# Patient Record
Sex: FEMALE | URBAN - METROPOLITAN AREA
[De-identification: names, ages, dates, MRNs, and addresses within clinical notes are randomized per-mention and may not be internally consistent; named-entity substitution may affect disease eponyms.]

---

## 2022-12-18 ENCOUNTER — INPATIENT (INPATIENT)
Facility: HOSPITAL | Age: 82
LOS: 4 days | Discharge: NURSING, REHAB OR LONG-TERM CARE | DRG: 638 | End: 2022-12-23
Attending: HOSPITALIST | Admitting: HOSPITALIST
Payer: MEDICARE

## 2022-12-18 VITALS
WEIGHT: 130.07 LBS | OXYGEN SATURATION: 97 % | SYSTOLIC BLOOD PRESSURE: 120 MMHG | DIASTOLIC BLOOD PRESSURE: 60 MMHG | HEIGHT: 58 IN | RESPIRATION RATE: 15 BRPM | TEMPERATURE: 98 F | HEART RATE: 98 BPM

## 2022-12-18 DIAGNOSIS — R41.82 ALTERED MENTAL STATUS, UNSPECIFIED: ICD-10-CM

## 2022-12-18 LAB
ACETONE SERPL-MCNC: ABNORMAL
ALBUMIN SERPL ELPH-MCNC: 3.1 G/DL — LOW (ref 3.3–5)
ALP SERPL-CCNC: 56 U/L — SIGNIFICANT CHANGE UP (ref 30–120)
ALT FLD-CCNC: 21 U/L DA — SIGNIFICANT CHANGE UP (ref 10–60)
ANION GAP SERPL CALC-SCNC: 10 MMOL/L — SIGNIFICANT CHANGE UP (ref 5–17)
APPEARANCE UR: ABNORMAL
AST SERPL-CCNC: 26 U/L — SIGNIFICANT CHANGE UP (ref 10–40)
BASE EXCESS BLDV CALC-SCNC: 1.7 MMOL/L — SIGNIFICANT CHANGE UP (ref -2–3)
BASOPHILS # BLD AUTO: 0.04 K/UL — SIGNIFICANT CHANGE UP (ref 0–0.2)
BASOPHILS NFR BLD AUTO: 0.3 % — SIGNIFICANT CHANGE UP (ref 0–2)
BILIRUB SERPL-MCNC: 0 MG/DL — LOW (ref 0.2–1.2)
BILIRUB UR-MCNC: NEGATIVE — SIGNIFICANT CHANGE UP
BUN SERPL-MCNC: 37 MG/DL — HIGH (ref 7–23)
CALCIUM SERPL-MCNC: 8.9 MG/DL — SIGNIFICANT CHANGE UP (ref 8.4–10.5)
CHLORIDE SERPL-SCNC: 98 MMOL/L — SIGNIFICANT CHANGE UP (ref 96–108)
CK MB BLD-MCNC: 1.2 % — SIGNIFICANT CHANGE UP (ref 0–3.5)
CK MB CFR SERPL CALC: 2.3 NG/ML — SIGNIFICANT CHANGE UP (ref 0–3.6)
CK SERPL-CCNC: 198 U/L — HIGH (ref 26–192)
CO2 SERPL-SCNC: 26 MMOL/L — SIGNIFICANT CHANGE UP (ref 22–31)
COLOR SPEC: YELLOW — SIGNIFICANT CHANGE UP
CREAT SERPL-MCNC: 1 MG/DL — SIGNIFICANT CHANGE UP (ref 0.5–1.3)
DIFF PNL FLD: ABNORMAL
EGFR: 56 ML/MIN/1.73M2 — LOW
EOSINOPHIL # BLD AUTO: 0 K/UL — SIGNIFICANT CHANGE UP (ref 0–0.5)
EOSINOPHIL NFR BLD AUTO: 0 % — SIGNIFICANT CHANGE UP (ref 0–6)
FLUAV AG NPH QL: SIGNIFICANT CHANGE UP
FLUBV AG NPH QL: SIGNIFICANT CHANGE UP
GLUCOSE BLDC GLUCOMTR-MCNC: 367 MG/DL — HIGH (ref 70–99)
GLUCOSE SERPL-MCNC: 430 MG/DL — HIGH (ref 70–99)
GLUCOSE UR QL: 1000 MG/DL
HCO3 BLDV-SCNC: 27 MMOL/L — SIGNIFICANT CHANGE UP (ref 22–29)
HCT VFR BLD CALC: 27.6 % — LOW (ref 34.5–45)
HGB BLD-MCNC: 8.9 G/DL — LOW (ref 11.5–15.5)
IMM GRANULOCYTES NFR BLD AUTO: 0.3 % — SIGNIFICANT CHANGE UP (ref 0–0.9)
KETONES UR-MCNC: ABNORMAL
LEUKOCYTE ESTERASE UR-ACNC: ABNORMAL
LIDOCAIN IGE QN: 400 U/L — HIGH (ref 73–393)
LYMPHOCYTES # BLD AUTO: 14.9 % — SIGNIFICANT CHANGE UP (ref 13–44)
LYMPHOCYTES # BLD AUTO: 2.29 K/UL — SIGNIFICANT CHANGE UP (ref 1–3.3)
MAGNESIUM SERPL-MCNC: 2.6 MG/DL — SIGNIFICANT CHANGE UP (ref 1.6–2.6)
MCHC RBC-ENTMCNC: 28.8 PG — SIGNIFICANT CHANGE UP (ref 27–34)
MCHC RBC-ENTMCNC: 32.2 GM/DL — SIGNIFICANT CHANGE UP (ref 32–36)
MCV RBC AUTO: 89.3 FL — SIGNIFICANT CHANGE UP (ref 80–100)
MONOCYTES # BLD AUTO: 0.96 K/UL — HIGH (ref 0–0.9)
MONOCYTES NFR BLD AUTO: 6.3 % — SIGNIFICANT CHANGE UP (ref 2–14)
NEUTROPHILS # BLD AUTO: 12.02 K/UL — HIGH (ref 1.8–7.4)
NEUTROPHILS NFR BLD AUTO: 78.2 % — HIGH (ref 43–77)
NITRITE UR-MCNC: NEGATIVE — SIGNIFICANT CHANGE UP
NRBC # BLD: 0 /100 WBCS — SIGNIFICANT CHANGE UP (ref 0–0)
PCO2 BLDV: 48 MMHG — HIGH (ref 39–42)
PH BLDV: 7.36 — SIGNIFICANT CHANGE UP (ref 7.32–7.43)
PH UR: 6.5 — SIGNIFICANT CHANGE UP (ref 5–8)
PLATELET # BLD AUTO: 357 K/UL — SIGNIFICANT CHANGE UP (ref 150–400)
PO2 BLDV: 35 MMHG — SIGNIFICANT CHANGE UP (ref 25–45)
POTASSIUM SERPL-MCNC: 4.8 MMOL/L — SIGNIFICANT CHANGE UP (ref 3.5–5.3)
POTASSIUM SERPL-SCNC: 4.8 MMOL/L — SIGNIFICANT CHANGE UP (ref 3.5–5.3)
PROT SERPL-MCNC: 7.1 G/DL — SIGNIFICANT CHANGE UP (ref 6–8.3)
PROT UR-MCNC: 15 MG/DL
RBC # BLD: 3.09 M/UL — LOW (ref 3.8–5.2)
RBC # FLD: 14 % — SIGNIFICANT CHANGE UP (ref 10.3–14.5)
RSV RNA NPH QL NAA+NON-PROBE: SIGNIFICANT CHANGE UP
SAO2 % BLDV: 57.9 % — LOW (ref 67–88)
SARS-COV-2 RNA SPEC QL NAA+PROBE: SIGNIFICANT CHANGE UP
SODIUM SERPL-SCNC: 134 MMOL/L — LOW (ref 135–145)
SP GR SPEC: 1.01 — SIGNIFICANT CHANGE UP (ref 1.01–1.02)
TROPONIN I, HIGH SENSITIVITY RESULT: 12.1 NG/L — SIGNIFICANT CHANGE UP
UROBILINOGEN FLD QL: NEGATIVE MG/DL — SIGNIFICANT CHANGE UP
WBC # BLD: 15.35 K/UL — HIGH (ref 3.8–10.5)
WBC # FLD AUTO: 15.35 K/UL — HIGH (ref 3.8–10.5)

## 2022-12-18 PROCEDURE — 99285 EMERGENCY DEPT VISIT HI MDM: CPT

## 2022-12-18 PROCEDURE — 73000 X-RAY EXAM OF COLLAR BONE: CPT | Mod: 26,LT

## 2022-12-18 PROCEDURE — 73030 X-RAY EXAM OF SHOULDER: CPT | Mod: 26,LT

## 2022-12-18 PROCEDURE — 99223 1ST HOSP IP/OBS HIGH 75: CPT | Mod: AI

## 2022-12-18 PROCEDURE — 72125 CT NECK SPINE W/O DYE: CPT | Mod: 26,MA

## 2022-12-18 PROCEDURE — 71045 X-RAY EXAM CHEST 1 VIEW: CPT | Mod: 26

## 2022-12-18 PROCEDURE — 73630 X-RAY EXAM OF FOOT: CPT | Mod: 26,LT

## 2022-12-18 PROCEDURE — 70450 CT HEAD/BRAIN W/O DYE: CPT | Mod: 26,MA

## 2022-12-18 PROCEDURE — 93010 ELECTROCARDIOGRAM REPORT: CPT

## 2022-12-18 RX ORDER — LANOLIN ALCOHOL/MO/W.PET/CERES
3 CREAM (GRAM) TOPICAL AT BEDTIME
Refills: 0 | Status: DISCONTINUED | OUTPATIENT
Start: 2022-12-18 | End: 2022-12-23

## 2022-12-18 RX ORDER — GLUCAGON INJECTION, SOLUTION 0.5 MG/.1ML
1 INJECTION, SOLUTION SUBCUTANEOUS ONCE
Refills: 0 | Status: DISCONTINUED | OUTPATIENT
Start: 2022-12-18 | End: 2022-12-23

## 2022-12-18 RX ORDER — SODIUM CHLORIDE 9 MG/ML
1000 INJECTION INTRAMUSCULAR; INTRAVENOUS; SUBCUTANEOUS ONCE
Refills: 0 | Status: COMPLETED | OUTPATIENT
Start: 2022-12-18 | End: 2022-12-18

## 2022-12-18 RX ORDER — INSULIN GLARGINE 100 [IU]/ML
20 INJECTION, SOLUTION SUBCUTANEOUS AT BEDTIME
Refills: 0 | Status: DISCONTINUED | OUTPATIENT
Start: 2022-12-18 | End: 2022-12-20

## 2022-12-18 RX ORDER — DEXTROSE 50 % IN WATER 50 %
15 SYRINGE (ML) INTRAVENOUS ONCE
Refills: 0 | Status: DISCONTINUED | OUTPATIENT
Start: 2022-12-18 | End: 2022-12-23

## 2022-12-18 RX ORDER — ONDANSETRON 8 MG/1
4 TABLET, FILM COATED ORAL EVERY 8 HOURS
Refills: 0 | Status: DISCONTINUED | OUTPATIENT
Start: 2022-12-18 | End: 2022-12-23

## 2022-12-18 RX ORDER — INSULIN LISPRO 100/ML
VIAL (ML) SUBCUTANEOUS
Refills: 0 | Status: DISCONTINUED | OUTPATIENT
Start: 2022-12-18 | End: 2022-12-20

## 2022-12-18 RX ORDER — DEXTROSE 50 % IN WATER 50 %
25 SYRINGE (ML) INTRAVENOUS ONCE
Refills: 0 | Status: DISCONTINUED | OUTPATIENT
Start: 2022-12-18 | End: 2022-12-23

## 2022-12-18 RX ORDER — SODIUM CHLORIDE 9 MG/ML
1000 INJECTION, SOLUTION INTRAVENOUS
Refills: 0 | Status: DISCONTINUED | OUTPATIENT
Start: 2022-12-18 | End: 2022-12-23

## 2022-12-18 RX ORDER — DILTIAZEM HCL 120 MG
120 CAPSULE, EXT RELEASE 24 HR ORAL DAILY
Refills: 0 | Status: DISCONTINUED | OUTPATIENT
Start: 2022-12-18 | End: 2022-12-23

## 2022-12-18 RX ORDER — ACETAMINOPHEN 500 MG
650 TABLET ORAL EVERY 6 HOURS
Refills: 0 | Status: DISCONTINUED | OUTPATIENT
Start: 2022-12-18 | End: 2022-12-23

## 2022-12-18 RX ORDER — SIMVASTATIN 20 MG/1
5 TABLET, FILM COATED ORAL AT BEDTIME
Refills: 0 | Status: DISCONTINUED | OUTPATIENT
Start: 2022-12-18 | End: 2022-12-23

## 2022-12-18 RX ADMIN — SODIUM CHLORIDE 1000 MILLILITER(S): 9 INJECTION INTRAMUSCULAR; INTRAVENOUS; SUBCUTANEOUS at 13:05

## 2022-12-18 RX ADMIN — INSULIN GLARGINE 20 UNIT(S): 100 INJECTION, SOLUTION SUBCUTANEOUS at 21:59

## 2022-12-18 RX ADMIN — SIMVASTATIN 5 MILLIGRAM(S): 20 TABLET, FILM COATED ORAL at 21:58

## 2022-12-18 RX ADMIN — SODIUM CHLORIDE 1000 MILLILITER(S): 9 INJECTION INTRAMUSCULAR; INTRAVENOUS; SUBCUTANEOUS at 14:00

## 2022-12-18 NOTE — ED ADULT NURSE NOTE - OBJECTIVE STATEMENT
83yo female c/o left shoulder pain secondary to a fall that occurred 1 week prior to ED arrival. "it hurts to move the arm" as per pt.

## 2022-12-18 NOTE — H&P ADULT - HISTORY OF PRESENT ILLNESS
81 yo f pmh HTN, HLD, DM2 brought in by son noted to have a FSBS of 400 at home.   Noted to having a fall last week.     In the ED    WBC 15.35, Na 134, Glucose 430  CT head and cervical neck negative  Lipase 400  U/A shows trace looks, with moderate blood    IV 1000 NS bolus X 1 given   83 yo f pmh HTN, HLD, DM2 brought in by son noted to have a FSBS of 400 at home.   Noted to having a fall this past friday on the 16th.  Son noticed her blood sugar was very high and patient couldn't herself upright and was falling.  Pain was also getting worse so he sofy her in.   History taken from the son over the phone.      In the ED    WBC 15.35, Na 134, Glucose 430  CT head and cervical neck negative  Lipase 400  U/A shows trace looks, with moderate blood    IV 1000 NS bolus X 1 given    Shoulder xrays show that there is a fracture.     Sent a consult out to Dr. Rodriguez orthopedic for eval

## 2022-12-18 NOTE — ED PROVIDER NOTE - CARE PLAN
1 Principal Discharge DX:	AMS (altered mental status)  Secondary Diagnosis:	Hyperglycemia  Secondary Diagnosis:	Shoulder fracture, left

## 2022-12-18 NOTE — H&P ADULT - ASSESSMENT
83 yo f pmh HTN, HLD, DM2 admitted for  83 yo f pmh HTN, HLD, DM2 admitted for Hyperglycemia and Left shoulder fracture    #Left shoulder fracture  GMF  consulted Ortho Dr. Rodriguez for consult  PT eval    #DM2 with hyperglycemia  hold metformin  continue lantus at night at 20 units qhs  MDISS  A1C  consult endocrine    #HTN  continue cardizem daily with hold parameters    #HLD  continue statins

## 2022-12-18 NOTE — H&P ADULT - NSHPPHYSICALEXAM_GEN_ALL_CORE
Vital Signs Last 24 Hrs  T(C): 37 (18 Dec 2022 10:56), Max: 37 (18 Dec 2022 10:56)  T(F): 98.6 (18 Dec 2022 10:56), Max: 98.6 (18 Dec 2022 10:56)  HR: 80 (18 Dec 2022 12:30) (80 - 98)  BP: 115/75 (18 Dec 2022 12:30) (112/65 - 120/60)  BP(mean): --  RR: 15 (18 Dec 2022 12:30) (15 - 16)  SpO2: 96% (18 Dec 2022 12:30) (96% - 97%)    Parameters below as of 18 Dec 2022 12:30  Patient On (Oxygen Delivery Method): room air      GENERAL: NAD, well-groomed, well-developed  HEAD:  Atraumatic, Normocephalic  EYES: EOMI, PERRLA, conjunctiva and sclera clear  ENMT: No tonsillar erythema, exudates, or enlargement; Moist mucous membranes  NECK: Supple, No JVD, Normal thyroid  CHEST/LUNG: Clear to percussion bilaterally; No rales, rhonchi, wheezing, or rubs  HEART: Regular rate and rhythm; No murmurs, rubs, or gallops  ABDOMEN: Soft, Nontender, Nondistended; Bowel sounds present  EXTREMITIES:  2+ Peripheral Pulses, No clubbing, cyanosis, or edema  NERVOUS SYSTEM:  Alert & Oriented X3, Good concentration; Motor Strength 5/5 B/L upper and lower extremities; DTRs 2+ intact and symmetric  SKIN: No rashes or lesions Vital Signs Last 24 Hrs  T(C): 37 (18 Dec 2022 10:56), Max: 37 (18 Dec 2022 10:56)  T(F): 98.6 (18 Dec 2022 10:56), Max: 98.6 (18 Dec 2022 10:56)  HR: 80 (18 Dec 2022 12:30) (80 - 98)  BP: 115/75 (18 Dec 2022 12:30) (112/65 - 120/60)  BP(mean): --  RR: 15 (18 Dec 2022 12:30) (15 - 16)  SpO2: 96% (18 Dec 2022 12:30) (96% - 97%)    Parameters below as of 18 Dec 2022 12:30  Patient On (Oxygen Delivery Method): room air      GENERAL: NAD, well-groomed, well-developed  HEAD:  Atraumatic, Normocephalic  EYES: EOMI, PERRLA, conjunctiva and sclera clear  ENMT: No tonsillar erythema, exudates, or enlargement; Moist mucous membranes  NECK: Supple, No JVD, Normal thyroid  CHEST/LUNG: Clear to percussion bilaterally; No rales, rhonchi, wheezing, or rubs  HEART: Regular rate and rhythm; No murmurs, rubs, or gallops  ABDOMEN: Soft, Nontender, Nondistended; Bowel sounds present  EXTREMITIES: +Left shoulder in sling,  2+ Peripheral Pulses, No clubbing, cyanosis, or edema  NERVOUS SYSTEM:  Alert & Oriented X3, Good concentration;  SKIN: No rashes or lesions

## 2022-12-18 NOTE — ED PROVIDER NOTE - PHYSICAL EXAMINATION
Patient currently awake and oriented x3  Diffuse tenderness to palpation to the left shoulder with limited range of motion swelling and ecchymosis.  Sensation is grossly intact to the left upper extremity no tenderness palpation to the left elbow forearm wrist or hand.   strength 5 out of 5 positive radial pulse cap refill less than 2 seconds  No tenderness to palpation to the right upper extremity full range of motion neurovascular intact  No pelvic tenderness to palpation  No tenderness to palpation to the right lower extremity neurovascular intact  Tenderness to palpation to the left lateral foot with no obvious deformity positive pedal pulse cap refill less than 2 seconds sensation grossly intact  No midline C–T just L tenderness to palpation.

## 2022-12-18 NOTE — ED ADULT NURSE NOTE - NSIMPLEMENTINTERV_GEN_ALL_ED
Implemented All Fall with Harm Risk Interventions:  Vivian to call system. Call bell, personal items and telephone within reach. Instruct patient to call for assistance. Room bathroom lighting operational. Non-slip footwear when patient is off stretcher. Physically safe environment: no spills, clutter or unnecessary equipment. Stretcher in lowest position, wheels locked, appropriate side rails in place. Provide visual cue, wrist band, yellow gown, etc. Monitor gait and stability. Monitor for mental status changes and reorient to person, place, and time. Review medications for side effects contributing to fall risk. Reinforce activity limits and safety measures with patient and family. Provide visual clues: red socks.

## 2022-12-18 NOTE — ED ADULT TRIAGE NOTE - NS ED TRIAGE AVPU SCALE
[FreeTextEntry1] : Use humidifier, saline nasal drops, encourage fluids and fever control as needed. Elevate head of bed. Return for spiking fever, worsening symptoms, respiratory distress or concerns.\par NO EVIDENCE FOR SINUSITIS\par \par ASTHMA - IN CONTROL\par CONTINUE ICS AND ALBUTEROL\par RTO PRN advised on signs and symptoms requiring re evaluation and concern.\par \par RTO FOR FLU VACCINE
Alert-The patient is alert, awake and responds to voice. The patient is oriented to time, place, and person. The triage nurse is able to obtain subjective information.

## 2022-12-18 NOTE — ED PROVIDER NOTE - CLINICAL SUMMARY MEDICAL DECISION MAKING FREE TEXT BOX
Patient presenting to the emergency room with a chief complaints of episode of altered mental status today and recent fall 16th with shoulder pain and foot pain.  For the fall on the 16th it appears the patient suffered a mechanical fall with no LOC.  Based on physical exam high suspicion for left shoulder fracture although neurovascularly intact.  Low suspicion for foot fracture.  Will obtain x-ray imaging of both the shoulder and foot.  For episode of altered mental status today patient was ambulatory and standing at the time and appeared to have a questionable syncopal episode with a brief period of altered mental status.  This may have been secondary to a vasovagal episode as patient has been in significant pain secondary to her suspected left shoulder fracture.  Her brief episode of altered mental status might of been from hypoperfusion following the event.  Currently awake and oriented x3.  Low suspicion for a CVA given the fact that this is a brief episode of generalized LOC and her altered mental status was likely due to hypoperfusion although she is also hyperglycemic so this could have been a contributing factor as well.  Will obtain screening labs serum acetone venous pH check UA urine culture to exclude a possible infectious course for hyperglycemia although this could also be pain induced.  Will obtain CT imaging of her head to exclude an underlying neurologic process will hydrate and monitor.  Labs noted patient with a mildly elevated white counts cold anion gap mild serum acetone on but normal venous pH.  Urine does not appear to be grossly infected.  CT head imaging reviewed no CT evidence of an acute intracranial hemorrhage.  There is not appear to be a fracture on patient's left foot although there is an acute comminuted left humeral head and neck fracture.  Given patient's age and advanced debility and elevated sugars with this episode of syncope is recommended that patient be admitted to the hospital for observation and further management.  Patient sugars are coming down with hydration.  Neurology was consulted and will see the patient.  Orthopedics was consulted by the hospitalist team.  At this time there does not appear that patient is in DKA and so no insulin drip will be required.  Will admit for further management and pain control.  Of note patient's left arm was placed in a sling and she remains neurovascularly intact.

## 2022-12-18 NOTE — ED ADULT TRIAGE NOTE - CHIEF COMPLAINT QUOTE
patient is from home, AMS as per pt's son, s/p fall last week and injured her left shoulder, noted swelling and bruise,      also FS over 400 at home

## 2022-12-18 NOTE — ED ADULT NURSE REASSESSMENT NOTE - NS ED NURSE REASSESS COMMENT FT1
Assumed care of pt from CLARIBEL Calero. aox3. Pt resting comfortably in stretcher. Pt reports pain to left shoulder and left leg. Denies chest pain, SOB, headaches, dizziness, n/v/d, abd pain and dysuria. Pt on cardiac monitor in NSR 84 HR. Respirations even and unlabored. Skin warm to touch.

## 2022-12-18 NOTE — ED PROVIDER NOTE - OBJECTIVE STATEMENT
Patient is an 82-year-old female who presents to the emergency room with a chief complaint of fall and questionable altered mental status.  Past medical history of hypertension, hyperlipidemia, diabetes.  Patient and son report that this past Friday patient suffered a mechanical fall injuring her left foot and left shoulder.  Patient son reports she has been complaining of pain to both the left foot and shoulder since he initially did a visual inspection did not see any acute injury and so the decision was made not to take the patient to the hospital.  She had no LOC during this initial fall and is not on any anticoagulants.  Patient has continued to complain of severe pain to the left shoulder and intermittent pain to the left foot.  This morning she awoke in her normal state of health and earlier in the morning he took the patient to the restroom.  At around 10 AM patient again had to go to the restroom.  She was being assisted by her aide when her legs suddenly gave out and she collapsed to the ground.  Her eyes were open and she had no generalized tonic-clonic shaking but patient did not respond immediately to them and when she began to talk again she appeared to be confused initially but this is since resolved.  EMS was called family checked her blood sugar was noted it was elevated at 487 and so the decision was made to take the patient to the hospital for further management.  Patient denies any fevers chills nausea vomiting chest pain shortness of breath or abdominal pain.  She does report pain to the left shoulder and left lateral foot.  Denies any extremity numbness.  Denies neck or back pain.  Pt is right hand dominant

## 2022-12-19 LAB
A1C WITH ESTIMATED AVERAGE GLUCOSE RESULT: 8.6 % — HIGH (ref 4–5.6)
ALBUMIN SERPL ELPH-MCNC: 2.8 G/DL — LOW (ref 3.3–5)
ALP SERPL-CCNC: 53 U/L — SIGNIFICANT CHANGE UP (ref 30–120)
ALT FLD-CCNC: 20 U/L DA — SIGNIFICANT CHANGE UP (ref 10–60)
ANION GAP SERPL CALC-SCNC: 7 MMOL/L — SIGNIFICANT CHANGE UP (ref 5–17)
AST SERPL-CCNC: 17 U/L — SIGNIFICANT CHANGE UP (ref 10–40)
BILIRUB SERPL-MCNC: 0.3 MG/DL — SIGNIFICANT CHANGE UP (ref 0.2–1.2)
BUN SERPL-MCNC: 37 MG/DL — HIGH (ref 7–23)
CALCIUM SERPL-MCNC: 8.4 MG/DL — SIGNIFICANT CHANGE UP (ref 8.4–10.5)
CHLORIDE SERPL-SCNC: 102 MMOL/L — SIGNIFICANT CHANGE UP (ref 96–108)
CO2 SERPL-SCNC: 29 MMOL/L — SIGNIFICANT CHANGE UP (ref 22–31)
CREAT SERPL-MCNC: 0.89 MG/DL — SIGNIFICANT CHANGE UP (ref 0.5–1.3)
EGFR: 64 ML/MIN/1.73M2 — SIGNIFICANT CHANGE UP
ESTIMATED AVERAGE GLUCOSE: 200 MG/DL — HIGH (ref 68–114)
GLUCOSE BLDC GLUCOMTR-MCNC: 171 MG/DL — HIGH (ref 70–99)
GLUCOSE BLDC GLUCOMTR-MCNC: 222 MG/DL — HIGH (ref 70–99)
GLUCOSE SERPL-MCNC: 139 MG/DL — HIGH (ref 70–99)
HCT VFR BLD CALC: 25.4 % — LOW (ref 34.5–45)
HGB BLD-MCNC: 8.1 G/DL — LOW (ref 11.5–15.5)
MCHC RBC-ENTMCNC: 28.3 PG — SIGNIFICANT CHANGE UP (ref 27–34)
MCHC RBC-ENTMCNC: 31.9 GM/DL — LOW (ref 32–36)
MCV RBC AUTO: 88.8 FL — SIGNIFICANT CHANGE UP (ref 80–100)
NRBC # BLD: 0 /100 WBCS — SIGNIFICANT CHANGE UP (ref 0–0)
PLATELET # BLD AUTO: 338 K/UL — SIGNIFICANT CHANGE UP (ref 150–400)
POTASSIUM SERPL-MCNC: 4.4 MMOL/L — SIGNIFICANT CHANGE UP (ref 3.5–5.3)
POTASSIUM SERPL-SCNC: 4.4 MMOL/L — SIGNIFICANT CHANGE UP (ref 3.5–5.3)
PROT SERPL-MCNC: 6.4 G/DL — SIGNIFICANT CHANGE UP (ref 6–8.3)
RBC # BLD: 2.86 M/UL — LOW (ref 3.8–5.2)
RBC # FLD: 13.8 % — SIGNIFICANT CHANGE UP (ref 10.3–14.5)
SODIUM SERPL-SCNC: 138 MMOL/L — SIGNIFICANT CHANGE UP (ref 135–145)
WBC # BLD: 10.4 K/UL — SIGNIFICANT CHANGE UP (ref 3.8–10.5)
WBC # FLD AUTO: 10.4 K/UL — SIGNIFICANT CHANGE UP (ref 3.8–10.5)

## 2022-12-19 PROCEDURE — 99221 1ST HOSP IP/OBS SF/LOW 40: CPT | Mod: FS

## 2022-12-19 PROCEDURE — 99233 SBSQ HOSP IP/OBS HIGH 50: CPT

## 2022-12-19 PROCEDURE — 99231 SBSQ HOSP IP/OBS SF/LOW 25: CPT | Mod: FS

## 2022-12-19 RX ORDER — OXYCODONE AND ACETAMINOPHEN 5; 325 MG/1; MG/1
1 TABLET ORAL EVERY 4 HOURS
Refills: 0 | Status: DISCONTINUED | OUTPATIENT
Start: 2022-12-19 | End: 2022-12-23

## 2022-12-19 RX ORDER — MORPHINE SULFATE 50 MG/1
2 CAPSULE, EXTENDED RELEASE ORAL EVERY 4 HOURS
Refills: 0 | Status: DISCONTINUED | OUTPATIENT
Start: 2022-12-19 | End: 2022-12-23

## 2022-12-19 RX ADMIN — MORPHINE SULFATE 2 MILLIGRAM(S): 50 CAPSULE, EXTENDED RELEASE ORAL at 10:15

## 2022-12-19 RX ADMIN — Medication 10: at 00:09

## 2022-12-19 RX ADMIN — Medication 2: at 12:08

## 2022-12-19 RX ADMIN — MORPHINE SULFATE 2 MILLIGRAM(S): 50 CAPSULE, EXTENDED RELEASE ORAL at 10:16

## 2022-12-19 RX ADMIN — Medication 4: at 15:38

## 2022-12-19 RX ADMIN — Medication 120 MILLIGRAM(S): at 06:24

## 2022-12-19 RX ADMIN — INSULIN GLARGINE 20 UNIT(S): 100 INJECTION, SOLUTION SUBCUTANEOUS at 21:38

## 2022-12-19 RX ADMIN — SIMVASTATIN 5 MILLIGRAM(S): 20 TABLET, FILM COATED ORAL at 21:21

## 2022-12-19 RX ADMIN — Medication 4: at 09:05

## 2022-12-19 NOTE — PHYSICAL THERAPY INITIAL EVALUATION ADULT - RANGE OF MOTION EXAMINATION, REHAB EVAL
LUE n/t except finger/wrist flex/ext which was WNL's/Right UE ROM was WNL (within normal limits)/bilateral lower extremity was ROM was WNL (within normal limits)

## 2022-12-19 NOTE — PHYSICAL THERAPY INITIAL EVALUATION ADULT - RISK REDUCTION/PREVENTION, PT EVAL
Immediate Post-Op Evalulation


Immediate Post-Op Evalulation


Procedure:  Colonoscopy


Date of Evaluation:  Mar 3, 2021


Time of Evaluation:  09:49


IV Fluids:  400 LR


Blood Products:  0


Estimated Blood Loss:  1


Urinary Output:  0


Blood Pressure Systolic:  136


Blood Pressure Diastolic:  85


Pulse Rate:  84


Respiratory Rate:  16


O2 Sat by Pulse Oximetry:  98


Temperature (Fahrenheit):  98


Pain Score (1-10):  1


Nausea:  No


Vomiting:  No


Complications


0


Patient Status:  awake, reacts, patent, none


Hydration Status:  adequate











Itz Arzola MD                 Mar 3, 2021 09:24 risk factors

## 2022-12-19 NOTE — CONSULT NOTE ADULT - SUBJECTIVE AND OBJECTIVE BOX
This is an 82 y.o. RHD female who fell at home onto her left shoulder. She is being admitted for uncontrolled hyperglycemia. Patient c/o severe left shoulder pain, inability to tolerate ROM. X-rays in ED reveal left proximal humerus fracture.    On exam: Left shoulder is in sling. + diffuse ecchymosis about shoulder girdle and arm to elbow. +swelling. + diffusely tender to palpation about humeral head.  This is an 82 y.o. RHD female who fell at home onto her left shoulder. She is being admitted for uncontrolled hyperglycemia. Patient c/o severe left shoulder pain, inability to tolerate ROM. X-rays in ED reveal left proximal humerus fracture.    On exam: Left shoulder is in sling. + diffuse ecchymosis about shoulder girdle and arm to elbow. +swelling. + diffusely tender to palpation about humeral head. Unable to tolerate elbow ROM secondary to shoulder pain. Good mobility with wrist ROM, reasonable  strength. SILT. 2+ radial pulse.    X-rays Left shoulder: comminuted, impacted right humeral head fracture, 2 part surgical neck fracture.  X-rays were reviewed with patient on computer on wheels.    A/P: as above. Sling, Ice, PT/OT for gentle elbow, wrist, and hand ROM as tolerated. Discussed non-operative care with patient who agrees with plan. Analgesia as needed. Discussed with Dr. Rafi Flores and Dr. Lara. F/u as outpatient with Dr. Flores in 1-2 weeks for repeat x-rays and re-evaluation.

## 2022-12-19 NOTE — ED ADULT NURSE REASSESSMENT NOTE - NS ED NURSE REASSESS COMMENT FT1
Pt resting comfortably in bed. Pt denies any complaints at the moment. Pt denies headaches, dizziness, chest pain, SOB, abd pain, n/v/d, dysuria. Pt on cardiac monitor in NSR 93 HR. Respirations even and unlabored. Skin warm to touch.

## 2022-12-19 NOTE — PROGRESS NOTE ADULT - SUBJECTIVE AND OBJECTIVE BOX
Patient is a 82y old  Female who presents with a chief complaint of     INTERVAL HPI/OVERNIGHT EVENTS:    no overnight events    MEDICATIONS  (STANDING):  dextrose 5%. 1000 milliLiter(s) (50 mL/Hr) IV Continuous <Continuous>  dextrose 50% Injectable 25 Gram(s) IV Push once  diltiazem    milliGRAM(s) Oral daily  glucagon  Injectable 1 milliGRAM(s) IntraMuscular once  insulin glargine Injectable (LANTUS) 20 Unit(s) SubCutaneous at bedtime  insulin lispro (ADMELOG) corrective regimen sliding scale   SubCutaneous three times a day before meals  simvastatin 5 milliGRAM(s) Oral at bedtime    MEDICATIONS  (PRN):  acetaminophen     Tablet .. 650 milliGRAM(s) Oral every 6 hours PRN Temp greater or equal to 38C (100.4F), Mild Pain (1 - 3)  aluminum hydroxide/magnesium hydroxide/simethicone Suspension 30 milliLiter(s) Oral every 4 hours PRN Dyspepsia  dextrose Oral Gel 15 Gram(s) Oral once PRN Blood Glucose LESS THAN 70 milliGRAM(s)/deciliter  melatonin 3 milliGRAM(s) Oral at bedtime PRN Insomnia  morphine  - Injectable 2 milliGRAM(s) IV Push every 4 hours PRN Moderate Pain (4 - 6)  ondansetron Injectable 4 milliGRAM(s) IV Push every 8 hours PRN Nausea and/or Vomiting  oxycodone    5 mG/acetaminophen 325 mG 1 Tablet(s) Oral every 4 hours PRN Mild Pain (1 - 3)      Allergies    No Known Allergies    Intolerances        REVIEW OF SYSTEMS:  as above    Vital Signs Last 24 Hrs  T(C): 36.7 (19 Dec 2022 07:49), Max: 37.1 (19 Dec 2022 00:48)  T(F): 98.1 (19 Dec 2022 07:49), Max: 98.7 (19 Dec 2022 00:48)  HR: 96 (19 Dec 2022 07:49) (85 - 100)  BP: 139/62 (19 Dec 2022 07:49) (105/81 - 174/64)  BP(mean): --  RR: 16 (19 Dec 2022 07:49) (16 - 20)  SpO2: 98% (19 Dec 2022 07:49) (96% - 99%)    Parameters below as of 19 Dec 2022 07:49  Patient On (Oxygen Delivery Method): room air        PHYSICAL EXAM:  GENERAL: NAD, well-groomed, well-developed  HEAD:  Atraumatic, Normocephalic  EYES: EOMI, PERRLA, conjunctiva and sclera clear  ENMT: No tonsillar erythema, exudates, or enlargement; Moist mucous membranes  NECK: Supple, No JVD, Normal thyroid  CHEST/LUNG: Clear to percussion bilaterally; No rales, rhonchi, wheezing, or rubs  HEART: Regular rate and rhythm; No murmurs, rubs, or gallops  ABDOMEN: Soft, Nontender, Nondistended; Bowel sounds present  EXTREMITIES: +Left shoulder in sling,  2+ Peripheral Pulses, No clubbing, cyanosis, or edema  NERVOUS SYSTEM:  Alert & Oriented X3, Good concentration;  SKIN: No rashes or lesions    LABS:                        8.1    10.40 )-----------( 338      ( 19 Dec 2022 12:51 )             25.4     19 Dec 2022 12:51    138    |  102    |  37     ----------------------------<  139    4.4     |  29     |  0.89     Ca    8.4        19 Dec 2022 12:51    TPro  6.4    /  Alb  2.8    /  TBili  0.3    /  DBili  x      /  AST  17     /  ALT  20     /  AlkPhos  53     19 Dec 2022 12:51      Urinalysis Basic - ( 18 Dec 2022 13:19 )    Color: Yellow / Appearance: Turbid / S.010 / pH: x  Gluc: x / Ketone: Small  / Bili: Negative / Urobili: Negative mg/dL   Blood: x / Protein: 15 mg/dL / Nitrite: Negative   Leuk Esterase: Small / RBC: 0-2 /HPF / WBC 3-5   Sq Epi: x / Non Sq Epi: Moderate / Bacteria: Many      CAPILLARY BLOOD GLUCOSE      POCT Blood Glucose.: 171 mg/dL (19 Dec 2022 11:59)  POCT Blood Glucose.: 222 mg/dL (19 Dec 2022 09:00)  POCT Blood Glucose.: 367 mg/dL (18 Dec 2022 23:50)  POCT Blood Glucose.: 376 mg/dL (18 Dec 2022 14:23)      RADIOLOGY & ADDITIONAL TESTS:

## 2022-12-19 NOTE — PHYSICAL THERAPY INITIAL EVALUATION ADULT - PERTINENT HX OF CURRENT PROBLEM, REHAB EVAL
83 yo f pmh HTN, HLD, DM2 brought in by son noted to have a FSBS of 400 at home.   Noted to having a fall this past friday on the 16th.  Son noticed her blood sugar was very high and patient couldn't herself upright and was falling.  Pain was also getting worse so he sofy her in.   History taken from the son over the phone. 83 yo f pmh HTN, HLD, DM2 brought in by son noted to have a FSBS of 400 at home.   Noted to having a fall this past Friday on the 16th.  Son noticed her blood sugar was very high and patient couldn't herself upright and was falling.  Pain was also getting worse so he brought her in.   History taken from the son over the phone. x-ray left shoulder + acute comminuted humeral and neck fracture.

## 2022-12-20 DIAGNOSIS — E11.9 TYPE 2 DIABETES MELLITUS WITHOUT COMPLICATIONS: ICD-10-CM

## 2022-12-20 LAB
ALBUMIN SERPL ELPH-MCNC: 2.7 G/DL — LOW (ref 3.3–5)
ALP SERPL-CCNC: 60 U/L — SIGNIFICANT CHANGE UP (ref 30–120)
ALT FLD-CCNC: 24 U/L DA — SIGNIFICANT CHANGE UP (ref 10–60)
ANION GAP SERPL CALC-SCNC: 8 MMOL/L — SIGNIFICANT CHANGE UP (ref 5–17)
AST SERPL-CCNC: 20 U/L — SIGNIFICANT CHANGE UP (ref 10–40)
BILIRUB SERPL-MCNC: 0.5 MG/DL — SIGNIFICANT CHANGE UP (ref 0.2–1.2)
BUN SERPL-MCNC: 45 MG/DL — HIGH (ref 7–23)
CALCIUM SERPL-MCNC: 8.3 MG/DL — LOW (ref 8.4–10.5)
CHLORIDE SERPL-SCNC: 99 MMOL/L — SIGNIFICANT CHANGE UP (ref 96–108)
CO2 SERPL-SCNC: 28 MMOL/L — SIGNIFICANT CHANGE UP (ref 22–31)
CREAT SERPL-MCNC: 1.01 MG/DL — SIGNIFICANT CHANGE UP (ref 0.5–1.3)
EGFR: 55 ML/MIN/1.73M2 — LOW
GLUCOSE BLDC GLUCOMTR-MCNC: 154 MG/DL — HIGH (ref 70–99)
GLUCOSE BLDC GLUCOMTR-MCNC: 172 MG/DL — HIGH (ref 70–99)
GLUCOSE BLDC GLUCOMTR-MCNC: 234 MG/DL — HIGH (ref 70–99)
GLUCOSE BLDC GLUCOMTR-MCNC: 269 MG/DL — HIGH (ref 70–99)
GLUCOSE SERPL-MCNC: 257 MG/DL — HIGH (ref 70–99)
HCT VFR BLD CALC: 26.6 % — LOW (ref 34.5–45)
HGB BLD-MCNC: 8.4 G/DL — LOW (ref 11.5–15.5)
MCHC RBC-ENTMCNC: 28.4 PG — SIGNIFICANT CHANGE UP (ref 27–34)
MCHC RBC-ENTMCNC: 31.6 GM/DL — LOW (ref 32–36)
MCV RBC AUTO: 89.9 FL — SIGNIFICANT CHANGE UP (ref 80–100)
NRBC # BLD: 0 /100 WBCS — SIGNIFICANT CHANGE UP (ref 0–0)
PLATELET # BLD AUTO: 363 K/UL — SIGNIFICANT CHANGE UP (ref 150–400)
POTASSIUM SERPL-MCNC: 4.3 MMOL/L — SIGNIFICANT CHANGE UP (ref 3.5–5.3)
POTASSIUM SERPL-SCNC: 4.3 MMOL/L — SIGNIFICANT CHANGE UP (ref 3.5–5.3)
PROT SERPL-MCNC: 6.9 G/DL — SIGNIFICANT CHANGE UP (ref 6–8.3)
RBC # BLD: 2.96 M/UL — LOW (ref 3.8–5.2)
RBC # FLD: 13.8 % — SIGNIFICANT CHANGE UP (ref 10.3–14.5)
SODIUM SERPL-SCNC: 135 MMOL/L — SIGNIFICANT CHANGE UP (ref 135–145)
WBC # BLD: 11.27 K/UL — HIGH (ref 3.8–10.5)
WBC # FLD AUTO: 11.27 K/UL — HIGH (ref 3.8–10.5)

## 2022-12-20 PROCEDURE — 99233 SBSQ HOSP IP/OBS HIGH 50: CPT

## 2022-12-20 PROCEDURE — 99221 1ST HOSP IP/OBS SF/LOW 40: CPT | Mod: FS

## 2022-12-20 RX ORDER — INSULIN LISPRO 100/ML
3 VIAL (ML) SUBCUTANEOUS
Refills: 0 | Status: DISCONTINUED | OUTPATIENT
Start: 2022-12-20 | End: 2022-12-23

## 2022-12-20 RX ORDER — INSULIN LISPRO 100/ML
VIAL (ML) SUBCUTANEOUS
Refills: 0 | Status: DISCONTINUED | OUTPATIENT
Start: 2022-12-20 | End: 2022-12-22

## 2022-12-20 RX ORDER — ENOXAPARIN SODIUM 100 MG/ML
40 INJECTION SUBCUTANEOUS EVERY 24 HOURS
Refills: 0 | Status: DISCONTINUED | OUTPATIENT
Start: 2022-12-20 | End: 2022-12-23

## 2022-12-20 RX ORDER — INSULIN GLARGINE 100 [IU]/ML
25 INJECTION, SOLUTION SUBCUTANEOUS AT BEDTIME
Refills: 0 | Status: DISCONTINUED | OUTPATIENT
Start: 2022-12-20 | End: 2022-12-23

## 2022-12-20 RX ORDER — INSULIN LISPRO 100/ML
VIAL (ML) SUBCUTANEOUS AT BEDTIME
Refills: 0 | Status: DISCONTINUED | OUTPATIENT
Start: 2022-12-20 | End: 2022-12-22

## 2022-12-20 RX ADMIN — SIMVASTATIN 5 MILLIGRAM(S): 20 TABLET, FILM COATED ORAL at 21:50

## 2022-12-20 RX ADMIN — ENOXAPARIN SODIUM 40 MILLIGRAM(S): 100 INJECTION SUBCUTANEOUS at 18:34

## 2022-12-20 RX ADMIN — OXYCODONE AND ACETAMINOPHEN 1 TABLET(S): 5; 325 TABLET ORAL at 00:42

## 2022-12-20 RX ADMIN — Medication 3 UNIT(S): at 12:42

## 2022-12-20 RX ADMIN — Medication 120 MILLIGRAM(S): at 06:55

## 2022-12-20 RX ADMIN — Medication 1: at 17:08

## 2022-12-20 RX ADMIN — INSULIN GLARGINE 25 UNIT(S): 100 INJECTION, SOLUTION SUBCUTANEOUS at 21:47

## 2022-12-20 RX ADMIN — Medication 3 UNIT(S): at 17:30

## 2022-12-20 RX ADMIN — Medication 2: at 12:43

## 2022-12-20 RX ADMIN — OXYCODONE AND ACETAMINOPHEN 1 TABLET(S): 5; 325 TABLET ORAL at 16:46

## 2022-12-20 RX ADMIN — OXYCODONE AND ACETAMINOPHEN 1 TABLET(S): 5; 325 TABLET ORAL at 00:12

## 2022-12-20 RX ADMIN — OXYCODONE AND ACETAMINOPHEN 1 TABLET(S): 5; 325 TABLET ORAL at 16:16

## 2022-12-20 RX ADMIN — Medication 6: at 08:13

## 2022-12-20 NOTE — PROGRESS NOTE ADULT - SUBJECTIVE AND OBJECTIVE BOX
Neurology follow up note    GE [id number]      Interval History:    Patient feels ok no new complaints.    Allergies    No Known Allergies    Intolerances        MEDICATIONS    acetaminophen     Tablet .. 650 milliGRAM(s) Oral every 6 hours PRN  aluminum hydroxide/magnesium hydroxide/simethicone Suspension 30 milliLiter(s) Oral every 4 hours PRN  dextrose 5%. 1000 milliLiter(s) IV Continuous <Continuous>  dextrose 50% Injectable 25 Gram(s) IV Push once  dextrose Oral Gel 15 Gram(s) Oral once PRN  diltiazem    milliGRAM(s) Oral daily  glucagon  Injectable 1 milliGRAM(s) IntraMuscular once  insulin glargine Injectable (LANTUS) 25 Unit(s) SubCutaneous at bedtime  insulin lispro (ADMELOG) corrective regimen sliding scale   SubCutaneous three times a day before meals  insulin lispro (ADMELOG) corrective regimen sliding scale   SubCutaneous at bedtime  insulin lispro Injectable (ADMELOG) 3 Unit(s) SubCutaneous three times a day before meals  melatonin 3 milliGRAM(s) Oral at bedtime PRN  morphine  - Injectable 2 milliGRAM(s) IV Push every 4 hours PRN  ondansetron Injectable 4 milliGRAM(s) IV Push every 8 hours PRN  oxycodone    5 mG/acetaminophen 325 mG 1 Tablet(s) Oral every 4 hours PRN  simvastatin 5 milliGRAM(s) Oral at bedtime              Vital Signs Last 24 Hrs  T(C): 37.3 (20 Dec 2022 11:56), Max: 37.3 (20 Dec 2022 11:56)  T(F): 99.2 (20 Dec 2022 11:56), Max: 99.2 (20 Dec 2022 11:56)  HR: 85 (20 Dec 2022 11:56) (79 - 90)  BP: 124/51 (20 Dec 2022 11:56) (101/56 - 140/60)  BP(mean): --  RR: 17 (20 Dec 2022 11:56) (17 - 22)  SpO2: 96% (20 Dec 2022 11:56) (96% - 98%)    Parameters below as of 20 Dec 2022 11:56  Patient On (Oxygen Delivery Method): room air      REVIEW OF SYSTEMS:  Constitutionally, the patient denies fever, chills, or night sweats.  Head:  No headaches.  Eyes:  No double vision or blurry vision.  Ears:  No ringing in the ears.  Neck:  No neck pain.  Cardiovascular:  No chest pain.  Respiratory:  No shortness of breath.  Abdomen:  No nausea, vomiting, or abdominal pain.  Extremities/Neurological:  No numbness or tingling.  Musculoskeletal:  Positive left shoulder pain, but does have a fracture.    PHYSICAL EXAMINATION:   HEENT:  Head:  Normocephalic, atraumatic.  Eyes:  No scleral icterus.  Ears:  Hearing bilaterally was intact.  NECK:  Supple.  CARDIOVASCULAR:  S1 and S2 heard.  RESPIRATORY:  Good air entry bilaterally.  ABDOMEN:  Soft, nontender.  EXTREMITIES:  No clubbing or cyanosis were noted.      NEUROLOGIC:  The patient awake and alert.  Extraocular movements were intact.  Speech was fluent.  Smile symmetric.  Right upper 5/5, left upper was not tested proximally secondary to fracture, but hand grasp was 4/5, bilateral lower extremities 4-/5.  Sensory:  Bilateral upper and lower intact to light touch.                LABS:  CBC Full  -  ( 20 Dec 2022 06:00 )  WBC Count : 11.27 K/uL  RBC Count : 2.96 M/uL  Hemoglobin : 8.4 g/dL  Hematocrit : 26.6 %  Platelet Count - Automated : 363 K/uL  Mean Cell Volume : 89.9 fl  Mean Cell Hemoglobin : 28.4 pg  Mean Cell Hemoglobin Concentration : 31.6 gm/dL  Auto Neutrophil # : x  Auto Lymphocyte # : x  Auto Monocyte # : x  Auto Eosinophil # : x  Auto Basophil # : x  Auto Neutrophil % : x  Auto Lymphocyte % : x  Auto Monocyte % : x  Auto Eosinophil % : x  Auto Basophil % : x    Urinalysis Basic - ( 18 Dec 2022 13:19 )    Color: Yellow / Appearance: Turbid / S.010 / pH: x  Gluc: x / Ketone: Small  / Bili: Negative / Urobili: Negative mg/dL   Blood: x / Protein: 15 mg/dL / Nitrite: Negative   Leuk Esterase: Small / RBC: 0-2 /HPF / WBC 3-5   Sq Epi: x / Non Sq Epi: Moderate / Bacteria: Many      12-20    135  |  99  |  45<H>  ----------------------------<  257<H>  4.3   |  28  |  1.01    Ca    8.3<L>      20 Dec 2022 06:00    TPro  6.9  /  Alb  2.7<L>  /  TBili  0.5  /  DBili  x   /  AST  20  /  ALT  24  /  AlkPhos  60  12-20    Hemoglobin A1C:     LIVER FUNCTIONS - ( 20 Dec 2022 06:00 )  Alb: 2.7 g/dL / Pro: 6.9 g/dL / ALK PHOS: 60 U/L / ALT: 24 U/L DA / AST: 20 U/L / GGT: x           Vitamin B12         RADIOLOGY      ANALYSIS AND PLAN:  This is an 82-year-old with episode of fall and altered mental status.  For episode of fall, most likely mechanical in nature.  No clear sign, even though examinations is limited of left upper extremity, to suggest a new cerebrovascular accident has ensued and no history to suggest underlying epilepsy.  For episode of altered mental status, most likely metabolic secondary to uncontrolled blood sugars, suspect less likely a primary CNS event such as cerebrovascular accident.  For history of diabetes, strict control of blood sugars.  For hyperlipidemia, statin.  For hypertension, monitor systolic blood pressure.  For left shoulder fracture, Orthopedics followup.      Spoke with son, Azeem, at [phone], as per him, she is back to her normal self.    Greater than 40 minutes of time spent with the patient, plan of care, reviewing data, and speaking to multidisciplinary healthcare team with greater than 50% of that time in counseling and care coordination. Neurology follow up note    GE [id number]      Interval History:    Patient feels occ pain in arm     Allergies    No Known Allergies    Intolerances        MEDICATIONS    acetaminophen     Tablet .. 650 milliGRAM(s) Oral every 6 hours PRN  aluminum hydroxide/magnesium hydroxide/simethicone Suspension 30 milliLiter(s) Oral every 4 hours PRN  dextrose 5%. 1000 milliLiter(s) IV Continuous <Continuous>  dextrose 50% Injectable 25 Gram(s) IV Push once  dextrose Oral Gel 15 Gram(s) Oral once PRN  diltiazem    milliGRAM(s) Oral daily  glucagon  Injectable 1 milliGRAM(s) IntraMuscular once  insulin glargine Injectable (LANTUS) 25 Unit(s) SubCutaneous at bedtime  insulin lispro (ADMELOG) corrective regimen sliding scale   SubCutaneous three times a day before meals  insulin lispro (ADMELOG) corrective regimen sliding scale   SubCutaneous at bedtime  insulin lispro Injectable (ADMELOG) 3 Unit(s) SubCutaneous three times a day before meals  melatonin 3 milliGRAM(s) Oral at bedtime PRN  morphine  - Injectable 2 milliGRAM(s) IV Push every 4 hours PRN  ondansetron Injectable 4 milliGRAM(s) IV Push every 8 hours PRN  oxycodone    5 mG/acetaminophen 325 mG 1 Tablet(s) Oral every 4 hours PRN  simvastatin 5 milliGRAM(s) Oral at bedtime              Vital Signs Last 24 Hrs  T(C): 37.3 (20 Dec 2022 11:56), Max: 37.3 (20 Dec 2022 11:56)  T(F): 99.2 (20 Dec 2022 11:56), Max: 99.2 (20 Dec 2022 11:56)  HR: 85 (20 Dec 2022 11:56) (79 - 90)  BP: 124/51 (20 Dec 2022 11:56) (101/56 - 140/60)  BP(mean): --  RR: 17 (20 Dec 2022 11:56) (17 - 22)  SpO2: 96% (20 Dec 2022 11:56) (96% - 98%)    Parameters below as of 20 Dec 2022 11:56  Patient On (Oxygen Delivery Method): room air      REVIEW OF SYSTEMS:  Constitutionally, the patient denies fever, chills, or night sweats.  Head:  No headaches.  Eyes:  No double vision or blurry vision.  Ears:  No ringing in the ears.  Neck:  No neck pain.  Cardiovascular:  No chest pain.  Respiratory:  No shortness of breath.  Abdomen:  No nausea, vomiting, or abdominal pain.  Extremities/Neurological:  No numbness or tingling.  Musculoskeletal:  Positive left shoulder pain, but does have a fracture.    PHYSICAL EXAMINATION:   HEENT:  Head:  Normocephalic, atraumatic.  Eyes:  No scleral icterus.  Ears:  Hearing bilaterally was intact.  NECK:  Supple.  CARDIOVASCULAR:  S1 and S2 heard.  RESPIRATORY:  Good air entry bilaterally.  ABDOMEN:  Soft, nontender.  EXTREMITIES:  No clubbing or cyanosis were noted.      NEUROLOGIC:  The patient awake and alert.  Extraocular movements were intact.  Speech was fluent.  Smile symmetric.  Right upper 5/5, left upper was not tested proximally secondary to fracture, but hand grasp was 4/5, bilateral lower extremities 4-/5.  Sensory:  Bilateral upper and lower intact to light touch.                LABS:  CBC Full  -  ( 20 Dec 2022 06:00 )  WBC Count : 11.27 K/uL  RBC Count : 2.96 M/uL  Hemoglobin : 8.4 g/dL  Hematocrit : 26.6 %  Platelet Count - Automated : 363 K/uL  Mean Cell Volume : 89.9 fl  Mean Cell Hemoglobin : 28.4 pg  Mean Cell Hemoglobin Concentration : 31.6 gm/dL  Auto Neutrophil # : x  Auto Lymphocyte # : x  Auto Monocyte # : x  Auto Eosinophil # : x  Auto Basophil # : x  Auto Neutrophil % : x  Auto Lymphocyte % : x  Auto Monocyte % : x  Auto Eosinophil % : x  Auto Basophil % : x    Urinalysis Basic - ( 18 Dec 2022 13:19 )    Color: Yellow / Appearance: Turbid / S.010 / pH: x  Gluc: x / Ketone: Small  / Bili: Negative / Urobili: Negative mg/dL   Blood: x / Protein: 15 mg/dL / Nitrite: Negative   Leuk Esterase: Small / RBC: 0-2 /HPF / WBC 3-5   Sq Epi: x / Non Sq Epi: Moderate / Bacteria: Many      12-20    135  |  99  |  45<H>  ----------------------------<  257<H>  4.3   |  28  |  1.01    Ca    8.3<L>      20 Dec 2022 06:00    TPro  6.9  /  Alb  2.7<L>  /  TBili  0.5  /  DBili  x   /  AST  20  /  ALT  24  /  AlkPhos  60  12-20    Hemoglobin A1C:     LIVER FUNCTIONS - ( 20 Dec 2022 06:00 )  Alb: 2.7 g/dL / Pro: 6.9 g/dL / ALK PHOS: 60 U/L / ALT: 24 U/L DA / AST: 20 U/L / GGT: x           Vitamin B12         RADIOLOGY      ANALYSIS AND PLAN:  This is an 82-year-old with episode of fall and altered mental status.  For episode of fall, most likely mechanical in nature.  No clear sign, even though examinations is limited of left upper extremity, to suggest a new cerebrovascular accident has ensued and no history to suggest underlying epilepsy.  For episode of altered mental status, most likely metabolic secondary to uncontrolled blood sugars, suspect less likely a primary CNS event such as cerebrovascular accident.  For history of diabetes, strict control of blood sugars.  For hyperlipidemia, statin.  For hypertension, monitor systolic blood pressure.  For left shoulder fracture, Orthopedics followup.  neurologic  wise stable       Spoke with son, Azeem, at [phone], as per him, she is back to her normal self.    Greater than 40 minutes of time spent with the patient, plan of care, reviewing data, and speaking to multidisciplinary healthcare team with greater than 50% of that time in counseling and care coordination.

## 2022-12-20 NOTE — DIETITIAN INITIAL EVALUATION ADULT - OTHER INFO
Visited patient in room, presents with fair appetite/po intake due to pain in shoulder, consuming >50-75% of meals. Denies n/v/d/c, no BM noted in house. No reported difficulty chewing or swallowing, noted missing teeth, per diet office, pt ordered herself soft foods. NKFA. Reported some weight loss, no further information provided due 2/2 pain, current adm weight 130#, will continue to monitor weight trends as able.     Pertinent medications/nutrition labs reviewed; noted -269, pt hx of DM over 10 years A1c 8.6% indicating fair glycemic control. In house ordered for Lantus 25 units, lispro sliding scale to aid in glucose management. Pt reported compliant with medication, checked FS at home with number >200.      Education is not appropriate at this time, will follow-up as able. Will add Glucerna 8oz (180 kcal, 10 g protein) qd to optimize intake. RD to continue to monitor nutrition status per protocol.

## 2022-12-20 NOTE — PROGRESS NOTE ADULT - SUBJECTIVE AND OBJECTIVE BOX
Patient is a 82y old  Female who presents with a chief complaint of     INTERVAL HPI/OVERNIGHT EVENTS:    no overnight events  seen by ortho and neuro yesterday      MEDICATIONS  (STANDING):  dextrose 5%. 1000 milliLiter(s) (50 mL/Hr) IV Continuous <Continuous>  dextrose 50% Injectable 25 Gram(s) IV Push once  diltiazem    milliGRAM(s) Oral daily  glucagon  Injectable 1 milliGRAM(s) IntraMuscular once  insulin glargine Injectable (LANTUS) 20 Unit(s) SubCutaneous at bedtime  insulin lispro (ADMELOG) corrective regimen sliding scale   SubCutaneous three times a day before meals  simvastatin 5 milliGRAM(s) Oral at bedtime    MEDICATIONS  (PRN):  acetaminophen     Tablet .. 650 milliGRAM(s) Oral every 6 hours PRN Temp greater or equal to 38C (100.4F), Mild Pain (1 - 3)  aluminum hydroxide/magnesium hydroxide/simethicone Suspension 30 milliLiter(s) Oral every 4 hours PRN Dyspepsia  dextrose Oral Gel 15 Gram(s) Oral once PRN Blood Glucose LESS THAN 70 milliGRAM(s)/deciliter  melatonin 3 milliGRAM(s) Oral at bedtime PRN Insomnia  morphine  - Injectable 2 milliGRAM(s) IV Push every 4 hours PRN Moderate Pain (4 - 6)  ondansetron Injectable 4 milliGRAM(s) IV Push every 8 hours PRN Nausea and/or Vomiting  oxycodone    5 mG/acetaminophen 325 mG 1 Tablet(s) Oral every 4 hours PRN Mild Pain (1 - 3)      Allergies    No Known Allergies    Intolerances        REVIEW OF SYSTEMS:  as above    Vital Signs Last 24 Hrs  T(C): 36.7 (20 Dec 2022 06:27), Max: 36.7 (19 Dec 2022 16:34)  T(F): 98.1 (20 Dec 2022 06:27), Max: 98.1 (20 Dec 2022 06:27)  HR: 84 (20 Dec 2022 06:27) (79 - 90)  BP: 140/60 (20 Dec 2022 06:27) (101/56 - 140/60)  BP(mean): --  RR: 18 (20 Dec 2022 06:27) (18 - 22)  SpO2: 98% (20 Dec 2022 06:27) (97% - 98%)    Parameters below as of 19 Dec 2022 14:22  Patient On (Oxygen Delivery Method): room air        PHYSICAL EXAM:  GENERAL: NAD, well-groomed, well-developed  HEAD:  Atraumatic, Normocephalic  EYES: EOMI, PERRLA, conjunctiva and sclera clear  ENMT: No tonsillar erythema, exudates, or enlargement; Moist mucous membranes  NECK: Supple, No JVD, Normal thyroid  CHEST/LUNG: Clear to percussion bilaterally; No rales, rhonchi, wheezing, or rubs  HEART: Regular rate and rhythm; No murmurs, rubs, or gallops  ABDOMEN: Soft, Nontender, Nondistended; Bowel sounds present  EXTREMITIES: +Left shoulder in sling,  2+ Peripheral Pulses, No clubbing, cyanosis, or edema  NERVOUS SYSTEM:  Alert & Oriented X3, Good concentration;  SKIN: No rashes or lesions    LABS:                        8.4    11.27 )-----------( 363      ( 20 Dec 2022 06:00 )             26.6     20 Dec 2022 06:00    135    |  99     |  45     ----------------------------<  257    4.3     |  28     |  1.01     Ca    8.3        20 Dec 2022 06:00    TPro  6.9    /  Alb  2.7    /  TBili  0.5    /  DBili  x      /  AST  20     /  ALT  24     /  AlkPhos  60     20 Dec 2022 06:00      Urinalysis Basic - ( 18 Dec 2022 13:19 )    Color: Yellow / Appearance: Turbid / S.010 / pH: x  Gluc: x / Ketone: Small  / Bili: Negative / Urobili: Negative mg/dL   Blood: x / Protein: 15 mg/dL / Nitrite: Negative   Leuk Esterase: Small / RBC: 0-2 /HPF / WBC 3-5   Sq Epi: x / Non Sq Epi: Moderate / Bacteria: Many      CAPILLARY BLOOD GLUCOSE      POCT Blood Glucose.: 269 mg/dL (20 Dec 2022 07:35)  POCT Blood Glucose.: 171 mg/dL (19 Dec 2022 21:15)  POCT Blood Glucose.: 222 mg/dL (19 Dec 2022 15:29)  POCT Blood Glucose.: 171 mg/dL (19 Dec 2022 11:59)      RADIOLOGY & ADDITIONAL TESTS:

## 2022-12-20 NOTE — CONSULT NOTE ADULT - ASSESSMENT
Physical Exam:   Vital Signs Last 24 Hrs  T(C): 36.7 (20 Dec 2022 06:27), Max: 36.7 (19 Dec 2022 16:34)  T(F): 98.1 (20 Dec 2022 06:27), Max: 98.1 (20 Dec 2022 06:27)  HR: 84 (20 Dec 2022 06:27) (79 - 90)  BP: 140/60 (20 Dec 2022 06:27) (101/56 - 140/60)  BP(mean): --  RR: 18 (20 Dec 2022 06:27) (18 - 22)  SpO2: 98% (20 Dec 2022 06:27) (97% - 98%)    Parameters below as of 19 Dec 2022 14:22  Patient On (Oxygen Delivery Method): room air             CAPILLARY BLOOD GLUCOSE      POCT Blood Glucose.: 269 mg/dL (20 Dec 2022 07:35)  POCT Blood Glucose.: 171 mg/dL (19 Dec 2022 21:15)  POCT Blood Glucose.: 222 mg/dL (19 Dec 2022 15:29)  POCT Blood Glucose.: 171 mg/dL (19 Dec 2022 11:59)      Cholesterol, Serum: 113 mg/dL (05.19.21 @ 08:36)     HDL Cholesterol, Serum: 22 mg/dL (05.19.21 @ 08:36)     LDL Cholesterol Calculated: 66 mg/dL (05.19.21 @ 08:36)     DIET: CC  >50%

## 2022-12-20 NOTE — PATIENT PROFILE ADULT - FUNCTIONAL ASSESSMENT - BASIC MOBILITY 6.
3-calculated by average/Not able to assess (calculate score using [facility] averaging method) 3 = A little assistance

## 2022-12-20 NOTE — DIETITIAN INITIAL EVALUATION ADULT - REASON FOR ADMISSION
Per H&P "81 yo f pmh HTN, HLD, DM2 brought in by son noted to have a FSBS of 400 at home.   Noted to having a fall this past friday on the 16th.  Son noticed her blood sugar was very high and patient couldn't herself upright and was falling.  Pain was also getting worse so he sofy her in.   History taken from the son over the phone. "

## 2022-12-20 NOTE — DIETITIAN INITIAL EVALUATION ADULT - CALCULATED TO (G/KG)
Patient Summary Document

 Created on:2020



Patient:MILANA VOGEL

Sex:Female

:1991

External Reference #:[id number]





Demographics







 Address  [address]



   [place] [address]

 

 Home Phone  [phone]

 

 Preferred Language  Unknown

 

 Marital Status  Unknown

 

 Latter-day Affiliation  Unknown

 

 Race  Unknown

 

 Additional Race(s)  Unavailable

 

 Ethnic Group  Unknown









Author







 Organization  [facility]

 

 Address  [address]



   [place] [address]

 

 Phone  [phone]









Care Team Providers







 Name  Role  Phone

 

 Unavailable  Unavailable  Unavailable









Problems

This patient has no known problems.



Allergies, Adverse Reactions, Alerts

This patient has no known allergies or adverse reactions.



Medications

This patient has no known medications.



Encounters







 Start  End  Encounter  Admission  Attending  Care  Care  Encounter



 Date/Time  Date/Time  Type  Type  Clinicians  Facility  Department  ID

 

 2018  Emergency      [facility]  MED  [id number]



 16:32:17  16:32:17            

 

 2018  Emergency      [facility]  [id number]



 16:31:11  16:31:11 82.46

## 2022-12-20 NOTE — DIETITIAN INITIAL EVALUATION ADULT - PERTINENT LABORATORY DATA
12-20    135  |  99  |  45<H>  ----------------------------<  257<H>  4.3   |  28  |  1.01    Ca    8.3<L>      20 Dec 2022 06:00    TPro  6.9  /  Alb  2.7<L>  /  TBili  0.5  /  DBili  x   /  AST  20  /  ALT  24  /  AlkPhos  60  12-20  POCT Blood Glucose.: 234 mg/dL (12-20-22 @ 12:19)  A1C with Estimated Average Glucose Result: 8.6 % (12-19-22 @ 12:51)

## 2022-12-20 NOTE — PATIENT PROFILE ADULT - FALL HARM RISK - HARM RISK INTERVENTIONS

## 2022-12-20 NOTE — CONSULT NOTE ADULT - SUBJECTIVE AND OBJECTIVE BOX
Patient is a 82y old  Female who presents with a chief complaint of     Type:2 DX >10  years. no known complications. Patient was getting ready to eat breakfast- hold off on further questions until complete meal.   (Endocrine Last seen Rx home Hx DKA/HHS, Glucometer checks, needs, weight, diet, exercise, diabetes education provided)    BG levels >200 mg/dL; increased insulin dosing, Dr Lara aware.       HPI:  83 yo f pmh HTN, HLD, DM2 brought in by son noted to have a FSBS of 400 at home.   Noted to having a fall this past friday on the 16th.  Son noticed her blood sugar was very high and patient couldn't herself upright and was falling.  Pain was also getting worse so he sofy her in.   History taken from the son over the phone.      In the ED    WBC 15.35, Na 134, Glucose 430  CT head and cervical neck negative  Lipase 400  U/A shows trace looks, with moderate blood    IV 1000 NS bolus X 1 given    Shoulder xrays show that there is a fracture.     Sent a consult out to Dr. Rodriguez orthopedic for eval   (18 Dec 2022 15:35)      PAST MEDICAL & SURGICAL HISTORY:  Type 2 diabetes mellitus      Essential hypertension      Hyperlipidemia      No significant past surgical history          Allergies    No Known Allergies    Intolerances        MEDICATIONS  (STANDING):  dextrose 5%. 1000 milliLiter(s) (50 mL/Hr) IV Continuous <Continuous>  dextrose 50% Injectable 25 Gram(s) IV Push once  diltiazem    milliGRAM(s) Oral daily  glucagon  Injectable 1 milliGRAM(s) IntraMuscular once  insulin glargine Injectable (LANTUS) 25 Unit(s) SubCutaneous at bedtime  insulin lispro (ADMELOG) corrective regimen sliding scale   SubCutaneous three times a day before meals  insulin lispro (ADMELOG) corrective regimen sliding scale   SubCutaneous at bedtime  insulin lispro Injectable (ADMELOG) 3 Unit(s) SubCutaneous three times a day before meals  simvastatin 5 milliGRAM(s) Oral at bedtime

## 2022-12-20 NOTE — DIETITIAN INITIAL EVALUATION ADULT - ORAL INTAKE PTA/DIET HISTORY
pt reported herself lacto-ovo vegetarian, but doesn't eat eggs. Appetite/po intake was good, was eating 3 meals a day. Lives at home, has aide cooks for her everyday. No vitamin/mineral or other nutrition supplements reported.

## 2022-12-20 NOTE — DIETITIAN INITIAL EVALUATION ADULT - PERTINENT MEDS FT
MEDICATIONS  (STANDING):  dextrose 5%. 1000 milliLiter(s) (50 mL/Hr) IV Continuous <Continuous>  dextrose 50% Injectable 25 Gram(s) IV Push once  diltiazem    milliGRAM(s) Oral daily  glucagon  Injectable 1 milliGRAM(s) IntraMuscular once  insulin glargine Injectable (LANTUS) 25 Unit(s) SubCutaneous at bedtime  insulin lispro (ADMELOG) corrective regimen sliding scale   SubCutaneous three times a day before meals  insulin lispro (ADMELOG) corrective regimen sliding scale   SubCutaneous at bedtime  insulin lispro Injectable (ADMELOG) 3 Unit(s) SubCutaneous three times a day before meals  simvastatin 5 milliGRAM(s) Oral at bedtime    MEDICATIONS  (PRN):  acetaminophen     Tablet .. 650 milliGRAM(s) Oral every 6 hours PRN Temp greater or equal to 38C (100.4F), Mild Pain (1 - 3)  aluminum hydroxide/magnesium hydroxide/simethicone Suspension 30 milliLiter(s) Oral every 4 hours PRN Dyspepsia  dextrose Oral Gel 15 Gram(s) Oral once PRN Blood Glucose LESS THAN 70 milliGRAM(s)/deciliter  melatonin 3 milliGRAM(s) Oral at bedtime PRN Insomnia  morphine  - Injectable 2 milliGRAM(s) IV Push every 4 hours PRN Moderate Pain (4 - 6)  ondansetron Injectable 4 milliGRAM(s) IV Push every 8 hours PRN Nausea and/or Vomiting  oxycodone    5 mG/acetaminophen 325 mG 1 Tablet(s) Oral every 4 hours PRN Mild Pain (1 - 3)

## 2022-12-21 LAB
-  AMIKACIN: SIGNIFICANT CHANGE UP
-  AMOXICILLIN/CLAVULANIC ACID: SIGNIFICANT CHANGE UP
-  AMPICILLIN/SULBACTAM: SIGNIFICANT CHANGE UP
-  AMPICILLIN: SIGNIFICANT CHANGE UP
-  AZTREONAM: SIGNIFICANT CHANGE UP
-  CEFAZOLIN: SIGNIFICANT CHANGE UP
-  CEFEPIME: SIGNIFICANT CHANGE UP
-  CEFOXITIN: SIGNIFICANT CHANGE UP
-  CEFTRIAXONE: SIGNIFICANT CHANGE UP
-  CEFUROXIME: SIGNIFICANT CHANGE UP
-  CIPROFLOXACIN: SIGNIFICANT CHANGE UP
-  ERTAPENEM: SIGNIFICANT CHANGE UP
-  GENTAMICIN: SIGNIFICANT CHANGE UP
-  IMIPENEM: SIGNIFICANT CHANGE UP
-  LEVOFLOXACIN: SIGNIFICANT CHANGE UP
-  MEROPENEM: SIGNIFICANT CHANGE UP
-  NITROFURANTOIN: SIGNIFICANT CHANGE UP
-  PIPERACILLIN/TAZOBACTAM: SIGNIFICANT CHANGE UP
-  TOBRAMYCIN: SIGNIFICANT CHANGE UP
-  TRIMETHOPRIM/SULFAMETHOXAZOLE: SIGNIFICANT CHANGE UP
ALBUMIN SERPL ELPH-MCNC: 2.7 G/DL — LOW (ref 3.3–5)
ALP SERPL-CCNC: 70 U/L — SIGNIFICANT CHANGE UP (ref 30–120)
ALT FLD-CCNC: 31 U/L DA — SIGNIFICANT CHANGE UP (ref 10–60)
ANION GAP SERPL CALC-SCNC: 7 MMOL/L — SIGNIFICANT CHANGE UP (ref 5–17)
AST SERPL-CCNC: 27 U/L — SIGNIFICANT CHANGE UP (ref 10–40)
BILIRUB SERPL-MCNC: 0.5 MG/DL — SIGNIFICANT CHANGE UP (ref 0.2–1.2)
BUN SERPL-MCNC: 38 MG/DL — HIGH (ref 7–23)
CALCIUM SERPL-MCNC: 8.5 MG/DL — SIGNIFICANT CHANGE UP (ref 8.4–10.5)
CHLORIDE SERPL-SCNC: 98 MMOL/L — SIGNIFICANT CHANGE UP (ref 96–108)
CO2 SERPL-SCNC: 28 MMOL/L — SIGNIFICANT CHANGE UP (ref 22–31)
CREAT SERPL-MCNC: 0.91 MG/DL — SIGNIFICANT CHANGE UP (ref 0.5–1.3)
CULTURE RESULTS: SIGNIFICANT CHANGE UP
EGFR: 62 ML/MIN/1.73M2 — SIGNIFICANT CHANGE UP
GLUCOSE BLDC GLUCOMTR-MCNC: 180 MG/DL — HIGH (ref 70–99)
GLUCOSE BLDC GLUCOMTR-MCNC: 195 MG/DL — HIGH (ref 70–99)
GLUCOSE BLDC GLUCOMTR-MCNC: 205 MG/DL — HIGH (ref 70–99)
GLUCOSE BLDC GLUCOMTR-MCNC: 232 MG/DL — HIGH (ref 70–99)
GLUCOSE BLDC GLUCOMTR-MCNC: 379 MG/DL — HIGH (ref 70–99)
GLUCOSE SERPL-MCNC: 182 MG/DL — HIGH (ref 70–99)
HCT VFR BLD CALC: 27.3 % — LOW (ref 34.5–45)
HGB BLD-MCNC: 8.8 G/DL — LOW (ref 11.5–15.5)
MCHC RBC-ENTMCNC: 28.7 PG — SIGNIFICANT CHANGE UP (ref 27–34)
MCHC RBC-ENTMCNC: 32.2 GM/DL — SIGNIFICANT CHANGE UP (ref 32–36)
MCV RBC AUTO: 88.9 FL — SIGNIFICANT CHANGE UP (ref 80–100)
METHOD TYPE: SIGNIFICANT CHANGE UP
NRBC # BLD: 0 /100 WBCS — SIGNIFICANT CHANGE UP (ref 0–0)
ORGANISM # SPEC MICROSCOPIC CNT: SIGNIFICANT CHANGE UP
PLATELET # BLD AUTO: 428 K/UL — HIGH (ref 150–400)
POTASSIUM SERPL-MCNC: 4.4 MMOL/L — SIGNIFICANT CHANGE UP (ref 3.5–5.3)
POTASSIUM SERPL-SCNC: 4.4 MMOL/L — SIGNIFICANT CHANGE UP (ref 3.5–5.3)
PROT SERPL-MCNC: 7.1 G/DL — SIGNIFICANT CHANGE UP (ref 6–8.3)
RBC # BLD: 3.07 M/UL — LOW (ref 3.8–5.2)
RBC # FLD: 13.7 % — SIGNIFICANT CHANGE UP (ref 10.3–14.5)
SODIUM SERPL-SCNC: 133 MMOL/L — LOW (ref 135–145)
SPECIMEN SOURCE: SIGNIFICANT CHANGE UP
WBC # BLD: 12.26 K/UL — HIGH (ref 3.8–10.5)
WBC # FLD AUTO: 12.26 K/UL — HIGH (ref 3.8–10.5)

## 2022-12-21 PROCEDURE — 99232 SBSQ HOSP IP/OBS MODERATE 35: CPT

## 2022-12-21 RX ADMIN — Medication 5: at 12:54

## 2022-12-21 RX ADMIN — OXYCODONE AND ACETAMINOPHEN 1 TABLET(S): 5; 325 TABLET ORAL at 13:00

## 2022-12-21 RX ADMIN — ENOXAPARIN SODIUM 40 MILLIGRAM(S): 100 INJECTION SUBCUTANEOUS at 18:14

## 2022-12-21 RX ADMIN — Medication 3 UNIT(S): at 08:27

## 2022-12-21 RX ADMIN — Medication 2: at 17:14

## 2022-12-21 RX ADMIN — OXYCODONE AND ACETAMINOPHEN 1 TABLET(S): 5; 325 TABLET ORAL at 23:00

## 2022-12-21 RX ADMIN — Medication 3 UNIT(S): at 12:53

## 2022-12-21 RX ADMIN — INSULIN GLARGINE 25 UNIT(S): 100 INJECTION, SOLUTION SUBCUTANEOUS at 21:55

## 2022-12-21 RX ADMIN — OXYCODONE AND ACETAMINOPHEN 1 TABLET(S): 5; 325 TABLET ORAL at 11:51

## 2022-12-21 RX ADMIN — SIMVASTATIN 5 MILLIGRAM(S): 20 TABLET, FILM COATED ORAL at 21:55

## 2022-12-21 RX ADMIN — Medication 3 MILLIGRAM(S): at 22:02

## 2022-12-21 RX ADMIN — Medication 120 MILLIGRAM(S): at 06:13

## 2022-12-21 RX ADMIN — Medication 1: at 08:28

## 2022-12-21 RX ADMIN — Medication 3 UNIT(S): at 17:15

## 2022-12-21 RX ADMIN — OXYCODONE AND ACETAMINOPHEN 1 TABLET(S): 5; 325 TABLET ORAL at 22:02

## 2022-12-21 NOTE — CONSULT NOTE ADULT - SUBJECTIVE AND OBJECTIVE BOX
Patient is a 82y old  Female who presents with a chief complaint of Per H&P "81 yo f pmh HTN, HLD, DM2 brought in by son noted to have a FSBS of 400 at home.   Noted to having a fall this past friday on the 16th.  Son noticed her blood sugar was very high and patient couldn't herself upright and was falling.  Pain was also getting worse so he sofy her in.   History taken from the son over the phone. "      (20 Dec 2022 16:08)      Reason For Consult: dm2 uncontrolled    HPI:  81 yo f pmh HTN, HLD, DM2 brought in by son noted to have a FSBS of 400 at home.   Noted to having a fall this past friday on the 16th.  Son noticed her blood sugar was very high and patient couldn't herself upright and was falling.  Pain was also getting worse so he sofy her in.   History taken from the son over the phone.      In the ED    WBC 15.35, Na 134, Glucose 430  CT head and cervical neck negative  Lipase 400  U/A shows trace looks, with moderate blood    IV 1000 NS bolus X 1 given    Shoulder xrays show that there is a fracture.     Sent a consult out to Dr. Rodriguez orthopedic for eval   (18 Dec 2022 15:35)      PAST MEDICAL & SURGICAL HISTORY:  Type 2 diabetes mellitus      Essential hypertension      Hyperlipidemia      No significant past surgical history          FAMILY HISTORY:  No pertinent family history in first degree relatives          Social History:    MEDICATIONS  (STANDING):  dextrose 5%. 1000 milliLiter(s) (50 mL/Hr) IV Continuous <Continuous>  dextrose 50% Injectable 25 Gram(s) IV Push once  diltiazem    milliGRAM(s) Oral daily  enoxaparin Injectable 40 milliGRAM(s) SubCutaneous every 24 hours  glucagon  Injectable 1 milliGRAM(s) IntraMuscular once  insulin glargine Injectable (LANTUS) 25 Unit(s) SubCutaneous at bedtime  insulin lispro (ADMELOG) corrective regimen sliding scale   SubCutaneous three times a day before meals  insulin lispro (ADMELOG) corrective regimen sliding scale   SubCutaneous at bedtime  insulin lispro Injectable (ADMELOG) 3 Unit(s) SubCutaneous three times a day before meals  simvastatin 5 milliGRAM(s) Oral at bedtime    MEDICATIONS  (PRN):  acetaminophen     Tablet .. 650 milliGRAM(s) Oral every 6 hours PRN Temp greater or equal to 38C (100.4F), Mild Pain (1 - 3)  aluminum hydroxide/magnesium hydroxide/simethicone Suspension 30 milliLiter(s) Oral every 4 hours PRN Dyspepsia  dextrose Oral Gel 15 Gram(s) Oral once PRN Blood Glucose LESS THAN 70 milliGRAM(s)/deciliter  melatonin 3 milliGRAM(s) Oral at bedtime PRN Insomnia  morphine  - Injectable 2 milliGRAM(s) IV Push every 4 hours PRN Moderate Pain (4 - 6)  ondansetron Injectable 4 milliGRAM(s) IV Push every 8 hours PRN Nausea and/or Vomiting  oxycodone    5 mG/acetaminophen 325 mG 1 Tablet(s) Oral every 4 hours PRN Mild Pain (1 - 3)        T(C): 36.4 (12-21-22 @ 05:42), Max: 37.3 (12-20-22 @ 11:56)  HR: 85 (12-21-22 @ 05:42) (85 - 92)  BP: 121/58 (12-21-22 @ 05:42) (91/52 - 124/51)  RR: 19 (12-21-22 @ 05:42) (17 - 19)  SpO2: 95% (12-21-22 @ 05:42) (95% - 96%)  Wt(kg): --    PHYSICAL EXAM:  GENERAL: NAD, well-groomed, well-developed  HEAD:  Atraumatic, Normocephalic  NECK: Supple, No JVD, Normal thyroid  CHEST/LUNG: Clear to percussion bilaterally; No rales, rhonchi, wheezing, or rubs  HEART: Regular rate and rhythm; No murmurs, rubs, or gallops  ABDOMEN: Soft, Nontender, Nondistended; Bowel sounds present  EXTREMITIES:  2+ Peripheral Pulses, No clubbing, cyanosis, or edema  SKIN: No rashes or lesions    CAPILLARY BLOOD GLUCOSE      POCT Blood Glucose.: 195 mg/dL (21 Dec 2022 08:11)  POCT Blood Glucose.: 172 mg/dL (20 Dec 2022 21:36)  POCT Blood Glucose.: 154 mg/dL (20 Dec 2022 17:08)  POCT Blood Glucose.: 234 mg/dL (20 Dec 2022 12:19)                            8.8    12.26 )-----------( 428      ( 21 Dec 2022 06:00 )             27.3       CMP:  12-21 @ 06:00  SGPT 31  Albumin 2.7   Alk Phos 70   Anion Gap 7   SGOT 27   Total Bili 0.5   BUN 38   Calcium Total 8.5   CO2 28   Chloride 98   Creatinine 0.91   eGFR if AA --   eGFR if non AA --   Glucose 182   Potassium 4.4   Protein 7.1   Sodium 133      Thyroid Function Tests:      Diabetes Tests:       Radiology:

## 2022-12-21 NOTE — PROGRESS NOTE ADULT - SUBJECTIVE AND OBJECTIVE BOX
Neurology follow up note    GE [id number]      Interval History:    Patient feels ok no new complaints.    Allergies    No Known Allergies    Intolerances        MEDICATIONS    acetaminophen     Tablet .. 650 milliGRAM(s) Oral every 6 hours PRN  aluminum hydroxide/magnesium hydroxide/simethicone Suspension 30 milliLiter(s) Oral every 4 hours PRN  dextrose 5%. 1000 milliLiter(s) IV Continuous <Continuous>  dextrose 50% Injectable 25 Gram(s) IV Push once  dextrose Oral Gel 15 Gram(s) Oral once PRN  diltiazem    milliGRAM(s) Oral daily  enoxaparin Injectable 40 milliGRAM(s) SubCutaneous every 24 hours  glucagon  Injectable 1 milliGRAM(s) IntraMuscular once  insulin glargine Injectable (LANTUS) 25 Unit(s) SubCutaneous at bedtime  insulin lispro (ADMELOG) corrective regimen sliding scale   SubCutaneous three times a day before meals  insulin lispro (ADMELOG) corrective regimen sliding scale   SubCutaneous at bedtime  insulin lispro Injectable (ADMELOG) 3 Unit(s) SubCutaneous three times a day before meals  melatonin 3 milliGRAM(s) Oral at bedtime PRN  morphine  - Injectable 2 milliGRAM(s) IV Push every 4 hours PRN  ondansetron Injectable 4 milliGRAM(s) IV Push every 8 hours PRN  oxycodone    5 mG/acetaminophen 325 mG 1 Tablet(s) Oral every 4 hours PRN  simvastatin 5 milliGRAM(s) Oral at bedtime              Vital Signs Last 24 Hrs  T(C): 36.4 (21 Dec 2022 05:42), Max: 37.2 (20 Dec 2022 23:25)  T(F): 97.5 (21 Dec 2022 05:42), Max: 98.9 (20 Dec 2022 23:25)  HR: 85 (21 Dec 2022 05:42) (85 - 92)  BP: 121/58 (21 Dec 2022 05:42) (91/52 - 121/58)  BP(mean): --  RR: 19 (21 Dec 2022 05:42) (18 - 19)  SpO2: 95% (21 Dec 2022 05:42) (95% - 96%)    Parameters below as of 21 Dec 2022 05:42  Patient On (Oxygen Delivery Method): room air        REVIEW OF SYSTEMS:  Constitutionally, the patient denies fever, chills, or night sweats.  Head:  No headaches.  Eyes:  No double vision or blurry vision.  Ears:  No ringing in the ears.  Neck:  No neck pain.  Cardiovascular:  No chest pain.  Respiratory:  No shortness of breath.  Abdomen:  No nausea, vomiting, or abdominal pain.  Extremities/Neurological:  No numbness or tingling.  Musculoskeletal:  Positive left shoulder pain, but does have a fracture.    PHYSICAL EXAMINATION:   HEENT:  Head:  Normocephalic, atraumatic.  Eyes:  No scleral icterus.  Ears:  Hearing bilaterally was intact.  NECK:  Supple.  CARDIOVASCULAR:  S1 and S2 heard.  RESPIRATORY:  Good air entry bilaterally.  ABDOMEN:  Soft, nontender.  EXTREMITIES:  No clubbing or cyanosis were noted.      NEUROLOGIC:  The patient awake and alert.  Extraocular movements were intact.  Speech was fluent.  Smile symmetric.  Right upper 5/5, left upper was not tested proximally secondary to fracture, but hand grasp was 4/5, bilateral lower extremities 4-/5.  Sensory:  Bilateral upper and lower intact to light touch.                     LABS:  CBC Full  -  ( 21 Dec 2022 06:00 )  WBC Count : 12.26 K/uL  RBC Count : 3.07 M/uL  Hemoglobin : 8.8 g/dL  Hematocrit : 27.3 %  Platelet Count - Automated : 428 K/uL  Mean Cell Volume : 88.9 fl  Mean Cell Hemoglobin : 28.7 pg  Mean Cell Hemoglobin Concentration : 32.2 gm/dL  Auto Neutrophil # : x  Auto Lymphocyte # : x  Auto Monocyte # : x  Auto Eosinophil # : x  Auto Basophil # : x  Auto Neutrophil % : x  Auto Lymphocyte % : x  Auto Monocyte % : x  Auto Eosinophil % : x  Auto Basophil % : x      12-21    133<L>  |  98  |  38<H>  ----------------------------<  182<H>  4.4   |  28  |  0.91    Ca    8.5      21 Dec 2022 06:00    TPro  7.1  /  Alb  2.7<L>  /  TBili  0.5  /  DBili  x   /  AST  27  /  ALT  31  /  AlkPhos  70  12-21    Hemoglobin A1C:     LIVER FUNCTIONS - ( 21 Dec 2022 06:00 )  Alb: 2.7 g/dL / Pro: 7.1 g/dL / ALK PHOS: 70 U/L / ALT: 31 U/L DA / AST: 27 U/L / GGT: x           Vitamin B12         RADIOLOGY  ANALYSIS AND PLAN:  This is an 82-year-old with episode of fall and altered mental status.  For episode of fall, most likely mechanical in nature.  No clear sign, even though examinations is limited of left upper extremity, to suggest a new cerebrovascular accident has ensued and no history to suggest underlying epilepsy.  For episode of altered mental status, most likely metabolic secondary to uncontrolled blood sugars, suspect less likely a primary CNS event such as cerebrovascular accident.  For history of diabetes, strict control of blood sugars.  For hyperlipidemia, statin.  For hypertension, monitor systolic blood pressure.  For left shoulder fracture, Orthopedics followup.  neurologic  wise stable       Greater than 45 minutes of time was spent with the patient, plan of care, reviewing data, with greater than 50% of the visit was spent counseling and/or coordinating care with multidisciplinary healthcare team

## 2022-12-21 NOTE — CONSULT NOTE ADULT - CONSULT REASON
dm2 uncontrolled
Left proximal humerus fracture
82y A1C with Estimated Average Glucose Result: 8.6 % (12-19-22 @ 12:51)   diabetes mellitus uncontrolled type 2

## 2022-12-21 NOTE — PROGRESS NOTE ADULT - SUBJECTIVE AND OBJECTIVE BOX
Patient is a 82y old  Female who presents with a chief complaint of Per H&P "83 yo f pmh HTN, HLD, DM2 brought in by son noted to have a FSBS of 400 at home.   Noted to having a fall this past friday on the 16th.  Son noticed her blood sugar was very high and patient couldn't herself upright and was falling.  Pain was also getting worse so he sofy her in.   History taken from the son over the phone. "      (20 Dec 2022 16:08)      SUBJECTIVE / OVERNIGHT EVENTS: pt seen and examined. no fever, no shob, NAD, no c/o pain        Vital Signs Last 24 Hrs  T(C): 36.4 (21 Dec 2022 05:42), Max: 37.3 (20 Dec 2022 11:56)  T(F): 97.5 (21 Dec 2022 05:42), Max: 99.2 (20 Dec 2022 11:56)  HR: 85 (21 Dec 2022 05:42) (85 - 92)  BP: 121/58 (21 Dec 2022 05:42) (91/52 - 124/51)  BP(mean): --  RR: 19 (21 Dec 2022 05:42) (17 - 19)  SpO2: 95% (21 Dec 2022 05:42) (95% - 96%)    Parameters below as of 21 Dec 2022 05:42  Patient On (Oxygen Delivery Method): room air      I&O's Summary      PHYSICAL EXAM:  GENERAL: NAD  HEAD:  Atraumatic, Normocephalic  NECK: Supple  CHEST/LUNG: CTABL  HEART: S1+S2  ABDOMEN: Soft, Nontender, Nondistended; Bowel sounds present  Neuro: no focal deficit  EXTREMITIES:  No edema  SKIN: No rashes or lesions    LABS:                        8.8    12.26 )-----------( 428      ( 21 Dec 2022 06:00 )             27.3     12-20    135  |  99  |  45<H>  ----------------------------<  257<H>  4.3   |  28  |  1.01    Ca    8.3<L>      20 Dec 2022 06:00    TPro  6.9  /  Alb  2.7<L>  /  TBili  0.5  /  DBili  x   /  AST  20  /  ALT  24  /  AlkPhos  60  12-20      CAPILLARY BLOOD GLUCOSE      POCT Blood Glucose.: 195 mg/dL (21 Dec 2022 08:11)  POCT Blood Glucose.: 172 mg/dL (20 Dec 2022 21:36)  POCT Blood Glucose.: 154 mg/dL (20 Dec 2022 17:08)  POCT Blood Glucose.: 234 mg/dL (20 Dec 2022 12:19)            RADIOLOGY & ADDITIONAL TESTS:    Imaging Personally Reviewed:  [x] YES  [ ] NO    Consultant(s) Notes Reviewed:  [x] YES  [ ] NO      MEDICATIONS  (STANDING):  dextrose 5%. 1000 milliLiter(s) (50 mL/Hr) IV Continuous <Continuous>  dextrose 50% Injectable 25 Gram(s) IV Push once  diltiazem    milliGRAM(s) Oral daily  enoxaparin Injectable 40 milliGRAM(s) SubCutaneous every 24 hours  glucagon  Injectable 1 milliGRAM(s) IntraMuscular once  insulin glargine Injectable (LANTUS) 25 Unit(s) SubCutaneous at bedtime  insulin lispro (ADMELOG) corrective regimen sliding scale   SubCutaneous three times a day before meals  insulin lispro (ADMELOG) corrective regimen sliding scale   SubCutaneous at bedtime  insulin lispro Injectable (ADMELOG) 3 Unit(s) SubCutaneous three times a day before meals  simvastatin 5 milliGRAM(s) Oral at bedtime    MEDICATIONS  (PRN):  acetaminophen     Tablet .. 650 milliGRAM(s) Oral every 6 hours PRN Temp greater or equal to 38C (100.4F), Mild Pain (1 - 3)  aluminum hydroxide/magnesium hydroxide/simethicone Suspension 30 milliLiter(s) Oral every 4 hours PRN Dyspepsia  dextrose Oral Gel 15 Gram(s) Oral once PRN Blood Glucose LESS THAN 70 milliGRAM(s)/deciliter  melatonin 3 milliGRAM(s) Oral at bedtime PRN Insomnia  morphine  - Injectable 2 milliGRAM(s) IV Push every 4 hours PRN Moderate Pain (4 - 6)  ondansetron Injectable 4 milliGRAM(s) IV Push every 8 hours PRN Nausea and/or Vomiting  oxycodone    5 mG/acetaminophen 325 mG 1 Tablet(s) Oral every 4 hours PRN Mild Pain (1 - 3)      Care Discussed with Consultants/Other Providers [x] YES  [ ] NO    HEALTH ISSUES - PROBLEM Dx:  Type 2 diabetes mellitus

## 2022-12-21 NOTE — CONSULT NOTE ADULT - PROBLEM SELECTOR RECOMMENDATION 9
cont current mdii   cont cons cho diet  goal bg 100-180 in hosp setting  to resume prior outpt anti-diabetes regimen upon d/c
Type 2 A1c 8.6% hyperglycemia  Recommend endocrine-Perlman on consult  Lantus 25 units HS daily; Admelog 3 units AC x3 day; low AC and low HS correction scale  FU appt: TBA  DSC recommendations: return to home regimen and glucose monitoring  diabetes education provided  Diabetes support info and cell # [phone] given   Goal 100-180 mg/dL; 140-180 mg/dL in critical care areas

## 2022-12-22 ENCOUNTER — TRANSCRIPTION ENCOUNTER (OUTPATIENT)
Age: 82
End: 2022-12-22

## 2022-12-22 LAB
ANION GAP SERPL CALC-SCNC: 6 MMOL/L — SIGNIFICANT CHANGE UP (ref 5–17)
ANION GAP SERPL CALC-SCNC: 7 MMOL/L — SIGNIFICANT CHANGE UP (ref 5–17)
BUN SERPL-MCNC: 43 MG/DL — HIGH (ref 7–23)
BUN SERPL-MCNC: 49 MG/DL — HIGH (ref 7–23)
CALCIUM SERPL-MCNC: 7.8 MG/DL — LOW (ref 8.4–10.5)
CALCIUM SERPL-MCNC: 8.5 MG/DL — SIGNIFICANT CHANGE UP (ref 8.4–10.5)
CHLORIDE SERPL-SCNC: 96 MMOL/L — SIGNIFICANT CHANGE UP (ref 96–108)
CHLORIDE SERPL-SCNC: 98 MMOL/L — SIGNIFICANT CHANGE UP (ref 96–108)
CO2 SERPL-SCNC: 26 MMOL/L — SIGNIFICANT CHANGE UP (ref 22–31)
CREAT SERPL-MCNC: 0.96 MG/DL — SIGNIFICANT CHANGE UP (ref 0.5–1.3)
CREAT SERPL-MCNC: 1.31 MG/DL — HIGH (ref 0.5–1.3)
EGFR: 40 ML/MIN/1.73M2 — LOW
EGFR: 58 ML/MIN/1.73M2 — LOW
GLUCOSE BLDC GLUCOMTR-MCNC: 126 MG/DL — HIGH (ref 70–99)
GLUCOSE BLDC GLUCOMTR-MCNC: 142 MG/DL — HIGH (ref 70–99)
GLUCOSE BLDC GLUCOMTR-MCNC: 255 MG/DL — HIGH (ref 70–99)
GLUCOSE BLDC GLUCOMTR-MCNC: 300 MG/DL — HIGH (ref 70–99)
GLUCOSE BLDC GLUCOMTR-MCNC: 427 MG/DL — HIGH (ref 70–99)
GLUCOSE BLDC GLUCOMTR-MCNC: 526 MG/DL — CRITICAL HIGH (ref 70–99)
GLUCOSE BLDC GLUCOMTR-MCNC: 561 MG/DL — CRITICAL HIGH (ref 70–99)
GLUCOSE SERPL-MCNC: 248 MG/DL — HIGH (ref 70–99)
GLUCOSE SERPL-MCNC: 580 MG/DL — CRITICAL HIGH (ref 70–99)
HCT VFR BLD CALC: 25.1 % — LOW (ref 34.5–45)
HGB BLD-MCNC: 8 G/DL — LOW (ref 11.5–15.5)
MCHC RBC-ENTMCNC: 28.2 PG — SIGNIFICANT CHANGE UP (ref 27–34)
MCHC RBC-ENTMCNC: 31.9 GM/DL — LOW (ref 32–36)
MCV RBC AUTO: 88.4 FL — SIGNIFICANT CHANGE UP (ref 80–100)
NRBC # BLD: 0 /100 WBCS — SIGNIFICANT CHANGE UP (ref 0–0)
PLATELET # BLD AUTO: 410 K/UL — HIGH (ref 150–400)
POTASSIUM SERPL-MCNC: 4.8 MMOL/L — SIGNIFICANT CHANGE UP (ref 3.5–5.3)
POTASSIUM SERPL-MCNC: 5 MMOL/L — SIGNIFICANT CHANGE UP (ref 3.5–5.3)
POTASSIUM SERPL-SCNC: 4.8 MMOL/L — SIGNIFICANT CHANGE UP (ref 3.5–5.3)
POTASSIUM SERPL-SCNC: 5 MMOL/L — SIGNIFICANT CHANGE UP (ref 3.5–5.3)
RBC # BLD: 2.84 M/UL — LOW (ref 3.8–5.2)
RBC # FLD: 13.4 % — SIGNIFICANT CHANGE UP (ref 10.3–14.5)
SARS-COV-2 RNA SPEC QL NAA+PROBE: SIGNIFICANT CHANGE UP
SODIUM SERPL-SCNC: 129 MMOL/L — LOW (ref 135–145)
SODIUM SERPL-SCNC: 130 MMOL/L — LOW (ref 135–145)
WBC # BLD: 9.12 K/UL — SIGNIFICANT CHANGE UP (ref 3.8–10.5)
WBC # FLD AUTO: 9.12 K/UL — SIGNIFICANT CHANGE UP (ref 3.8–10.5)

## 2022-12-22 PROCEDURE — 99233 SBSQ HOSP IP/OBS HIGH 50: CPT

## 2022-12-22 PROCEDURE — 99232 SBSQ HOSP IP/OBS MODERATE 35: CPT | Mod: FS

## 2022-12-22 RX ORDER — INSULIN LISPRO 100/ML
10 VIAL (ML) SUBCUTANEOUS ONCE
Refills: 0 | Status: COMPLETED | OUTPATIENT
Start: 2022-12-22 | End: 2022-12-22

## 2022-12-22 RX ORDER — LANOLIN ALCOHOL/MO/W.PET/CERES
1 CREAM (GRAM) TOPICAL
Qty: 0 | Refills: 0 | DISCHARGE
Start: 2022-12-22

## 2022-12-22 RX ORDER — ACETAMINOPHEN 500 MG
2 TABLET ORAL
Qty: 0 | Refills: 0 | DISCHARGE
Start: 2022-12-22

## 2022-12-22 RX ORDER — OXYCODONE AND ACETAMINOPHEN 5; 325 MG/1; MG/1
1 TABLET ORAL
Qty: 0 | Refills: 0 | DISCHARGE
Start: 2022-12-22

## 2022-12-22 RX ORDER — POLYETHYLENE GLYCOL 3350 17 G/17G
17 POWDER, FOR SOLUTION ORAL
Qty: 119 | Refills: 0
Start: 2022-12-22 | End: 2022-12-28

## 2022-12-22 RX ORDER — INSULIN LISPRO 100/ML
VIAL (ML) SUBCUTANEOUS
Refills: 0 | Status: DISCONTINUED | OUTPATIENT
Start: 2022-12-22 | End: 2022-12-23

## 2022-12-22 RX ORDER — SODIUM CHLORIDE 9 MG/ML
1000 INJECTION INTRAMUSCULAR; INTRAVENOUS; SUBCUTANEOUS
Refills: 0 | Status: DISCONTINUED | OUTPATIENT
Start: 2022-12-22 | End: 2022-12-23

## 2022-12-22 RX ORDER — POLYETHYLENE GLYCOL 3350 17 G/17G
17 POWDER, FOR SOLUTION ORAL DAILY
Refills: 0 | Status: DISCONTINUED | OUTPATIENT
Start: 2022-12-22 | End: 2022-12-23

## 2022-12-22 RX ORDER — INSULIN LISPRO 100/ML
VIAL (ML) SUBCUTANEOUS AT BEDTIME
Refills: 0 | Status: DISCONTINUED | OUTPATIENT
Start: 2022-12-22 | End: 2022-12-23

## 2022-12-22 RX ORDER — POLYETHYLENE GLYCOL 3350 17 G/17G
17 POWDER, FOR SOLUTION ORAL
Qty: 0 | Refills: 0 | DISCHARGE
Start: 2022-12-22

## 2022-12-22 RX ORDER — SODIUM CHLORIDE 9 MG/ML
1000 INJECTION INTRAMUSCULAR; INTRAVENOUS; SUBCUTANEOUS ONCE
Refills: 0 | Status: COMPLETED | OUTPATIENT
Start: 2022-12-22 | End: 2022-12-22

## 2022-12-22 RX ADMIN — Medication 3 UNIT(S): at 14:11

## 2022-12-22 RX ADMIN — OXYCODONE AND ACETAMINOPHEN 1 TABLET(S): 5; 325 TABLET ORAL at 08:38

## 2022-12-22 RX ADMIN — MORPHINE SULFATE 2 MILLIGRAM(S): 50 CAPSULE, EXTENDED RELEASE ORAL at 06:18

## 2022-12-22 RX ADMIN — Medication 120 MILLIGRAM(S): at 05:43

## 2022-12-22 RX ADMIN — OXYCODONE AND ACETAMINOPHEN 1 TABLET(S): 5; 325 TABLET ORAL at 09:08

## 2022-12-22 RX ADMIN — Medication 10 UNIT(S): at 13:06

## 2022-12-22 RX ADMIN — Medication 3: at 08:18

## 2022-12-22 RX ADMIN — POLYETHYLENE GLYCOL 3350 17 GRAM(S): 17 POWDER, FOR SOLUTION ORAL at 10:39

## 2022-12-22 RX ADMIN — MORPHINE SULFATE 2 MILLIGRAM(S): 50 CAPSULE, EXTENDED RELEASE ORAL at 05:51

## 2022-12-22 RX ADMIN — Medication 5 MILLIGRAM(S): at 21:44

## 2022-12-22 RX ADMIN — Medication 12: at 14:11

## 2022-12-22 RX ADMIN — Medication 3 UNIT(S): at 08:17

## 2022-12-22 RX ADMIN — SODIUM CHLORIDE 75 MILLILITER(S): 9 INJECTION INTRAMUSCULAR; INTRAVENOUS; SUBCUTANEOUS at 10:40

## 2022-12-22 RX ADMIN — MORPHINE SULFATE 2 MILLIGRAM(S): 50 CAPSULE, EXTENDED RELEASE ORAL at 20:46

## 2022-12-22 RX ADMIN — SODIUM CHLORIDE 1000 MILLILITER(S): 9 INJECTION INTRAMUSCULAR; INTRAVENOUS; SUBCUTANEOUS at 13:06

## 2022-12-22 RX ADMIN — SIMVASTATIN 5 MILLIGRAM(S): 20 TABLET, FILM COATED ORAL at 21:44

## 2022-12-22 RX ADMIN — INSULIN GLARGINE 25 UNIT(S): 100 INJECTION, SOLUTION SUBCUTANEOUS at 21:45

## 2022-12-22 RX ADMIN — Medication 1 TABLET(S): at 17:55

## 2022-12-22 RX ADMIN — ENOXAPARIN SODIUM 40 MILLIGRAM(S): 100 INJECTION SUBCUTANEOUS at 17:56

## 2022-12-22 RX ADMIN — SODIUM CHLORIDE 75 MILLILITER(S): 9 INJECTION INTRAMUSCULAR; INTRAVENOUS; SUBCUTANEOUS at 20:46

## 2022-12-22 RX ADMIN — MORPHINE SULFATE 2 MILLIGRAM(S): 50 CAPSULE, EXTENDED RELEASE ORAL at 23:24

## 2022-12-22 RX ADMIN — Medication 3 UNIT(S): at 17:55

## 2022-12-22 NOTE — DISCHARGE NOTE NURSING/CASE MANAGEMENT/SOCIAL WORK - NSDCPEFALRISK_GEN_ALL_CORE
For information on Fall & Injury Prevention, visit: [email or web address] OR  [email or web address] OR  https://www.cdc.gov/steadi/patient.html

## 2022-12-22 NOTE — DISCHARGE NOTE PROVIDER - HOSPITAL COURSE
83 yo f pmh HTN, HLD, DM2 admitted for Hyperglycemia and Left shoulder fracture    #Left shoulder fracture  consulted Ortho Dr. Rodriguez for consult  PT eval  ortho recs appreciated - out pt follow up    #Episode of AMS likely 2/2 uti  pt back to her baseline  seen by Neuro  episode of AMS likely from hyperglycemia - resolved, aao x3  - initiated on augmentin 12/22    #DM2 with hyperglycemia  hcont home meds    #HTN  continue cardizem daily with hold parameters    #HLD  continue statins

## 2022-12-22 NOTE — DISCHARGE NOTE NURSING/CASE MANAGEMENT/SOCIAL WORK - PATIENT PORTAL LINK FT
You can access the FollowMyHealth Patient Portal offered by [facility] by registering at the following website: [email or web address]. By joining [employer]’s FollowMyHealth portal, you will also be able to view your health information using other applications (apps) compatible with our system.

## 2022-12-22 NOTE — PROGRESS NOTE ADULT - SUBJECTIVE AND OBJECTIVE BOX
Patient is a 82y old  Female who presents with a chief complaint of Per H&P "81 yo f pmh HTN, HLD, DM2 brought in by son noted to have a FSBS of 400 at home.   Noted to having a fall this past friday on the 16th.  Son noticed her blood sugar was very high and patient couldn't herself upright and was falling.  Pain was also getting worse so he sofy her in.   History taken from the son over the phone. "      (20 Dec 2022 16:08)    UPDATES: Type 2 DM, A1c 8.6. F/S 255>561>526, pt A&Ox4, mentating appropriately, reports dry mouth  CC diet + glucerna shake 1 daily  accucheck ACHS  Lantus 25 units @ HS  admelog 3 units premeal  ОЛЬГА    HPI:  81 yo f pmh HTN, HLD, DM2 brought in by son noted to have a FSBS of 400 at home.   Noted to having a fall this past friday on the 16th.  Son noticed her blood sugar was very high and patient couldn't herself upright and was falling.  Pain was also getting worse so he sofy her in.   History taken from the son over the phone.      In the ED    WBC 15.35, Na 134, Glucose 430  CT head and cervical neck negative  Lipase 400  U/A shows trace looks, with moderate blood    IV 1000 NS bolus X 1 given    Shoulder xrays show that there is a fracture.     Sent a consult out to Dr. Rodriguez orthopedic for eval   (18 Dec 2022 15:35)      PAST MEDICAL & SURGICAL HISTORY:  Type 2 diabetes mellitus      Essential hypertension      Hyperlipidemia      No significant past surgical history    Allergies    No Known Allergies    Intolerances        MEDICATIONS  (STANDING):  amoxicillin  875 milliGRAM(s)/clavulanate 1 Tablet(s) Oral two times a day  bisacodyl 5 milliGRAM(s) Oral at bedtime  dextrose 5%. 1000 milliLiter(s) (50 mL/Hr) IV Continuous <Continuous>  dextrose 50% Injectable 25 Gram(s) IV Push once  diltiazem    milliGRAM(s) Oral daily  enoxaparin Injectable 40 milliGRAM(s) SubCutaneous every 24 hours  glucagon  Injectable 1 milliGRAM(s) IntraMuscular once  insulin glargine Injectable (LANTUS) 25 Unit(s) SubCutaneous at bedtime  insulin lispro (ADMELOG) corrective regimen sliding scale   SubCutaneous three times a day before meals  insulin lispro (ADMELOG) corrective regimen sliding scale   SubCutaneous at bedtime  insulin lispro Injectable (ADMELOG) 3 Unit(s) SubCutaneous three times a day before meals  polyethylene glycol 3350 17 Gram(s) Oral daily  simvastatin 5 milliGRAM(s) Oral at bedtime  sodium chloride 0.9%. 1000 milliLiter(s) (75 mL/Hr) IV Continuous <Continuous>

## 2022-12-22 NOTE — DISCHARGE NOTE PROVIDER - NSDCMRMEDTOKEN_GEN_ALL_CORE_FT
acetaminophen 325 mg oral tablet: 2 tab(s) orally every 6 hours, As needed, Temp greater or equal to 38C (100.4F), Mild Pain (1 - 3)  amoxicillin-clavulanate 875 mg-125 mg oral tablet: 1 tab(s) orally 2 times a day  cyanocobalamin 50 mcg oral tablet: 1 tab(s) orally once a day  DilTIAZem (Eqv-Dilacor XR) 120 mg/24 hours oral capsule, extended release: 1 cap(s) orally once a day  Doculase 100 mg oral capsule: 1 cap(s) orally 2 times a day  insulin glargine 100 units/mL subcutaneous solution: 26 unit(s) subcutaneous once a day  liraglutide 18 mg/3 mL subcutaneous solution:   melatonin 3 mg oral tablet: 1 tab(s) orally once a day (at bedtime), As needed, Insomnia  metFORMIN 1000 mg oral tablet: 1 tab(s) orally once a day (at bedtime)  oxycodone-acetaminophen 5 mg-325 mg oral tablet: 1 tab(s) orally every 4 hours, As needed, Mild Pain (1 - 3)  simvastatin 5 mg oral tablet: 1 tab(s) orally once a day (at bedtime)  [employer] ClearLax oral powder for reconstitution: 17 gram(s) orally once a day    acetaminophen 325 mg oral tablet: 2 tab(s) orally every 6 hours, As needed, Temp greater or equal to 38C (100.4F), Mild Pain (1 - 3)  amoxicillin-clavulanate 875 mg-125 mg oral tablet: 1 tab(s) orally 2 times a day  cyanocobalamin 50 mcg oral tablet: 1 tab(s) orally once a day  DilTIAZem (Eqv-Dilacor XR) 120 mg/24 hours oral capsule, extended release: 1 cap(s) orally once a day  Doculase 100 mg oral capsule: 1 cap(s) orally 2 times a day  insulin glargine 100 units/mL subcutaneous solution: 26 unit(s) subcutaneous once a day  liraglutide 18 mg/3 mL subcutaneous solution:   melatonin 3 mg oral tablet: 1 tab(s) orally once a day (at bedtime), As needed, Insomnia  metFORMIN 1000 mg oral tablet: 1 tab(s) orally once a day (at bedtime)  oxycodone-acetaminophen 5 mg-325 mg oral tablet: 1 tab(s) orally every 4 hours, As needed, Mild Pain (1 - 3)  polyethylene glycol 3350 oral powder for reconstitution: 17 gram(s) orally once a day  simvastatin 5 mg oral tablet: 1 tab(s) orally once a day (at bedtime)

## 2022-12-22 NOTE — PROGRESS NOTE ADULT - ASSESSMENT
81 yo f pmh HTN, HLD, DM2 admitted for Hyperglycemia and Left shoulder fracture    #Left shoulder fracture  GMF  consulted Ortho Dr. Rodriguez for consult  PT eval  ortho recs appreciated    #Episode of AMS  pt back to her baseline  seen by Neuro  episode of AMS likely from hyperglycemia    #DM2 with hyperglycemia  hold metformin  continue lantus at night at 20 units qhs  MDISS  A1C  consult endocrine, recs appreciated    #HTN  continue cardizem daily with hold parameters    #HLD  continue statins    
83 yo f pmh HTN, HLD, DM2 admitted for Hyperglycemia and Left shoulder fracture    #Left shoulder fracture  GMF  consulted Ortho Dr. Rodriguez for consult  PT eval  ortho recs pending    #DM2 with hyperglycemia  hold metformin  continue lantus at night at 20 units qhs  MDISS  A1C  consult endocrine, recs appreciated    #HTN  continue cardizem daily with hold parameters    #HLD  continue statins    
83 yo f pmh HTN, HLD, DM2 admitted for Hyperglycemia and Left shoulder fracture    #Left shoulder fracture  consulted Ortho Dr. Rodriguez for consult  PT eval  ortho recs appreciated    #Episode of AMS likely 2/2 uti  pt back to her baseline  seen by Neuro  episode of AMS likely from hyperglycemia - resolved, aao x3  - initiated on augmentin 12/22    #DM2 with hyperglycemia  hold metformin  continue lantus at night at 20 units qhs  MDISS  A1C  consult endocrine, recs appreciated    #HTN  continue cardizem daily with hold parameters    #HLD  continue statins    asked pt and she said she is too weak and can not stand on her own , plan to dc ot tanner  
81 yo f pmh HTN, HLD, DM2 admitted for Hyperglycemia and Left shoulder fracture    #Left shoulder fracture  consulted Ortho Dr. Rodriguez for consult  PT eval  ortho recs appreciated    #Episode of AMS  pt back to her baseline  seen by Neuro  episode of AMS likely from hyperglycemia - resolved, aao x3    #DM2 with hyperglycemia  hold metformin  continue lantus at night at 20 units qhs  MDISS  A1C  consult endocrine, recs appreciated    #HTN  continue cardizem daily with hold parameters    #HLD  continue statins    asked pt and she said she is too weak and can not stand on her own , pt ordered  
Physical Exam:   Vital Signs Last 24 Hrs  T(C): 36.8 (22 Dec 2022 12:39), Max: 37.3 (21 Dec 2022 21:34)  T(F): 98.2 (22 Dec 2022 12:39), Max: 99.2 (21 Dec 2022 21:34)  HR: 84 (22 Dec 2022 12:39) (79 - 92)  BP: 104/66 (22 Dec 2022 11:39) (103/66 - 157/62)  BP(mean): --  RR: 18 (22 Dec 2022 06:03) (16 - 19)  SpO2: 98% (22 Dec 2022 06:03) (96% - 98%)    Parameters below as of 22 Dec 2022 06:03  Patient On (Oxygen Delivery Method): room air        General: NAD, denies Fever, chills  CVS: S1S2 no M/R/G  Resp: CTA in all fields  : no freq, no urgency, no dysuria  Extremeties: no edema, + pulses         CAPILLARY BLOOD GLUCOSE      POCT Blood Glucose.: 526 mg/dL (22 Dec 2022 11:58)  POCT Blood Glucose.: 561 mg/dL (22 Dec 2022 11:55)  POCT Blood Glucose.: 255 mg/dL (22 Dec 2022 08:15)  POCT Blood Glucose.: 205 mg/dL (21 Dec 2022 23:37)  POCT Blood Glucose.: 180 mg/dL (21 Dec 2022 21:22)  POCT Blood Glucose.: 232 mg/dL (21 Dec 2022 16:46)      Cholesterol, Serum: 113 mg/dL (05.19.21 @ 08:36)     HDL Cholesterol, Serum: 22 mg/dL (05.19.21 @ 08:36)     LDL Cholesterol Calculated: 66 mg/dL (05.19.21 @ 08:36)     DIET: CC  >50%

## 2022-12-22 NOTE — PROGRESS NOTE ADULT - SUBJECTIVE AND OBJECTIVE BOX
Patient is a 82y old  Female who presents with a chief complaint of Per H&P "83 yo f pmh HTN, HLD, DM2 brought in by son noted to have a FSBS of 400 at home.   Noted to having a fall this past friday on the 16th.  Son noticed her blood sugar was very high and patient couldn't herself upright and was falling.  Pain was also getting worse so he sofy her in.   History taken from the son over the phone. "      (20 Dec 2022 16:08)      SUBJECTIVE / OVERNIGHT EVENTS: pt seen and examined. no fever, no shob, NAD, no c/o pain        Vital Signs Last 24 Hrs  T(C): 36.6 (22 Dec 2022 06:03), Max: 37.3 (21 Dec 2022 21:34)  T(F): 97.9 (22 Dec 2022 06:03), Max: 99.2 (21 Dec 2022 21:34)  HR: 86 (22 Dec 2022 06:03) (79 - 92)  BP: 157/62 (22 Dec 2022 06:03) (103/66 - 157/62)  BP(mean): --  RR: 18 (22 Dec 2022 06:03) (16 - 19)  SpO2: 98% (22 Dec 2022 06:03) (96% - 98%)    Parameters below as of 22 Dec 2022 06:03  Patient On (Oxygen Delivery Method): room air      I&O's Summary      PHYSICAL EXAM:  GENERAL: NAD  HEAD:  Atraumatic, Normocephalic  NECK: Supple  CHEST/LUNG: CTABL  HEART: S1+S2  ABDOMEN: Soft, Nontender, Nondistended; Bowel sounds present  Neuro: no focal deficit  EXTREMITIES:  No edema  SKIN: No rashes or lesions    LABS:                        8.0    9.12  )-----------( 410      ( 22 Dec 2022 06:00 )             25.1     12-22    130<L>  |  98  |  43<H>  ----------------------------<  248<H>  4.8   |  26  |  0.96    Ca    8.5      22 Dec 2022 06:00    TPro  7.1  /  Alb  2.7<L>  /  TBili  0.5  /  DBili  x   /  AST  27  /  ALT  31  /  AlkPhos  70  12-21      CAPILLARY BLOOD GLUCOSE      POCT Blood Glucose.: 255 mg/dL (22 Dec 2022 08:15)  POCT Blood Glucose.: 205 mg/dL (21 Dec 2022 23:37)  POCT Blood Glucose.: 180 mg/dL (21 Dec 2022 21:22)  POCT Blood Glucose.: 232 mg/dL (21 Dec 2022 16:46)  POCT Blood Glucose.: 379 mg/dL (21 Dec 2022 12:19)            RADIOLOGY & ADDITIONAL TESTS:    Imaging Personally Reviewed:  [x] YES  [ ] NO    Consultant(s) Notes Reviewed:  [x] YES  [ ] NO      MEDICATIONS  (STANDING):  amoxicillin  875 milliGRAM(s)/clavulanate 1 Tablet(s) Oral two times a day  dextrose 5%. 1000 milliLiter(s) (50 mL/Hr) IV Continuous <Continuous>  dextrose 50% Injectable 25 Gram(s) IV Push once  diltiazem    milliGRAM(s) Oral daily  enoxaparin Injectable 40 milliGRAM(s) SubCutaneous every 24 hours  glucagon  Injectable 1 milliGRAM(s) IntraMuscular once  insulin glargine Injectable (LANTUS) 25 Unit(s) SubCutaneous at bedtime  insulin lispro (ADMELOG) corrective regimen sliding scale   SubCutaneous three times a day before meals  insulin lispro (ADMELOG) corrective regimen sliding scale   SubCutaneous at bedtime  insulin lispro Injectable (ADMELOG) 3 Unit(s) SubCutaneous three times a day before meals  simvastatin 5 milliGRAM(s) Oral at bedtime    MEDICATIONS  (PRN):  acetaminophen     Tablet .. 650 milliGRAM(s) Oral every 6 hours PRN Temp greater or equal to 38C (100.4F), Mild Pain (1 - 3)  aluminum hydroxide/magnesium hydroxide/simethicone Suspension 30 milliLiter(s) Oral every 4 hours PRN Dyspepsia  dextrose Oral Gel 15 Gram(s) Oral once PRN Blood Glucose LESS THAN 70 milliGRAM(s)/deciliter  melatonin 3 milliGRAM(s) Oral at bedtime PRN Insomnia  morphine  - Injectable 2 milliGRAM(s) IV Push every 4 hours PRN Moderate Pain (4 - 6)  ondansetron Injectable 4 milliGRAM(s) IV Push every 8 hours PRN Nausea and/or Vomiting  oxycodone    5 mG/acetaminophen 325 mG 1 Tablet(s) Oral every 4 hours PRN Mild Pain (1 - 3)      Care Discussed with Consultants/Other Providers [x] YES  [ ] NO    HEALTH ISSUES - PROBLEM Dx:  Type 2 diabetes mellitus

## 2022-12-22 NOTE — PROGRESS NOTE ADULT - SUBJECTIVE AND OBJECTIVE BOX
Neurology follow up note    GE [id number]      Interval History:    Patient feels ok no new complaints.    Allergies    No Known Allergies    Intolerances        MEDICATIONS    acetaminophen     Tablet .. 650 milliGRAM(s) Oral every 6 hours PRN  aluminum hydroxide/magnesium hydroxide/simethicone Suspension 30 milliLiter(s) Oral every 4 hours PRN  amoxicillin  875 milliGRAM(s)/clavulanate 1 Tablet(s) Oral two times a day  bisacodyl 5 milliGRAM(s) Oral at bedtime  dextrose 5%. 1000 milliLiter(s) IV Continuous <Continuous>  dextrose 50% Injectable 25 Gram(s) IV Push once  dextrose Oral Gel 15 Gram(s) Oral once PRN  diltiazem    milliGRAM(s) Oral daily  enoxaparin Injectable 40 milliGRAM(s) SubCutaneous every 24 hours  glucagon  Injectable 1 milliGRAM(s) IntraMuscular once  insulin glargine Injectable (LANTUS) 25 Unit(s) SubCutaneous at bedtime  insulin lispro (ADMELOG) corrective regimen sliding scale   SubCutaneous three times a day before meals  insulin lispro (ADMELOG) corrective regimen sliding scale   SubCutaneous at bedtime  insulin lispro Injectable (ADMELOG) 3 Unit(s) SubCutaneous three times a day before meals  melatonin 3 milliGRAM(s) Oral at bedtime PRN  morphine  - Injectable 2 milliGRAM(s) IV Push every 4 hours PRN  ondansetron Injectable 4 milliGRAM(s) IV Push every 8 hours PRN  oxycodone    5 mG/acetaminophen 325 mG 1 Tablet(s) Oral every 4 hours PRN  polyethylene glycol 3350 17 Gram(s) Oral daily  simvastatin 5 milliGRAM(s) Oral at bedtime  sodium chloride 0.9% Bolus 1000 milliLiter(s) IV Bolus once  sodium chloride 0.9%. 1000 milliLiter(s) IV Continuous <Continuous>              Vital Signs Last 24 Hrs  T(C): 36.8 (22 Dec 2022 12:39), Max: 37.3 (21 Dec 2022 21:34)  T(F): 98.2 (22 Dec 2022 12:39), Max: 99.2 (21 Dec 2022 21:34)  HR: 84 (22 Dec 2022 12:39) (79 - 92)  BP: 104/66 (22 Dec 2022 11:39) (103/66 - 157/62)  BP(mean): --  RR: 18 (22 Dec 2022 06:03) (16 - 19)  SpO2: 98% (22 Dec 2022 06:03) (96% - 98%)    Parameters below as of 22 Dec 2022 06:03  Patient On (Oxygen Delivery Method): room air          REVIEW OF SYSTEMS:  Constitutionally, the patient denies fever, chills, or night sweats.  Head:  No headaches.  Eyes:  No double vision or blurry vision.  Ears:  No ringing in the ears.  Neck:  No neck pain.  Cardiovascular:  No chest pain.  Respiratory:  No shortness of breath.  Abdomen:  No nausea, vomiting, or abdominal pain.  Extremities/Neurological:  No numbness or tingling.  Musculoskeletal:  Positive left shoulder pain, but does have a fracture.    PHYSICAL EXAMINATION:   HEENT:  Head:  Normocephalic, atraumatic.  Eyes:  No scleral icterus.  Ears:  Hearing bilaterally was intact.  NECK:  Supple.  CARDIOVASCULAR:  S1 and S2 heard.  RESPIRATORY:  Good air entry bilaterally.  ABDOMEN:  Soft, nontender.  EXTREMITIES:  No clubbing or cyanosis were noted.      NEUROLOGIC:  The patient awake and alert.  Extraocular movements were intact.  Speech was fluent.  Smile symmetric.  Right upper 5/5, left upper was not tested proximally secondary to fracture, but hand grasp was 4/5, bilateral lower extremities 4-/5.  Sensory:  Bilateral upper and lower intact to light touch.                LABS:  CBC Full  -  ( 22 Dec 2022 06:00 )  WBC Count : 9.12 K/uL  RBC Count : 2.84 M/uL  Hemoglobin : 8.0 g/dL  Hematocrit : 25.1 %  Platelet Count - Automated : 410 K/uL  Mean Cell Volume : 88.4 fl  Mean Cell Hemoglobin : 28.2 pg  Mean Cell Hemoglobin Concentration : 31.9 gm/dL  Auto Neutrophil # : x  Auto Lymphocyte # : x  Auto Monocyte # : x  Auto Eosinophil # : x  Auto Basophil # : x  Auto Neutrophil % : x  Auto Lymphocyte % : x  Auto Monocyte % : x  Auto Eosinophil % : x  Auto Basophil % : x      12-22    129<L>  |  96  |  49<H>  ----------------------------<  580<HH>  5.0   |  26  |  1.31<H>    Ca    7.8<L>      22 Dec 2022 12:03    TPro  7.1  /  Alb  2.7<L>  /  TBili  0.5  /  DBili  x   /  AST  27  /  ALT  31  /  AlkPhos  70  12-21    Hemoglobin A1C:     LIVER FUNCTIONS - ( 21 Dec 2022 06:00 )  Alb: 2.7 g/dL / Pro: 7.1 g/dL / ALK PHOS: 70 U/L / ALT: 31 U/L DA / AST: 27 U/L / GGT: x           Vitamin B12         RADIOLOGY      ANALYSIS AND PLAN:  This is an 82-year-old with episode of fall and altered mental status.  For episode of fall, most likely mechanical in nature.  No clear sign, even though examinations is limited of left upper extremity, to suggest a new cerebrovascular accident has ensued and no history to suggest underlying epilepsy.  For episode of altered mental status, most likely metabolic secondary to uncontrolled blood sugars, suspect less likely a primary CNS event such as cerebrovascular accident.  For history of diabetes, strict control of blood sugars.  For hyperlipidemia, statin.  For hypertension, monitor systolic blood pressure.  For left shoulder fracture, Orthopedics followup.  neurologic  wise stable     Greater than 40 minutes of time was spent with the patient, plan of care, reviewing data, with greater than 50% of the visit was spent counseling and/or coordinating care with multidisciplinary healthcare team

## 2022-12-23 VITALS
OXYGEN SATURATION: 97 % | DIASTOLIC BLOOD PRESSURE: 58 MMHG | TEMPERATURE: 98 F | RESPIRATION RATE: 18 BRPM | SYSTOLIC BLOOD PRESSURE: 123 MMHG | HEART RATE: 81 BPM

## 2022-12-23 LAB
GLUCOSE BLDC GLUCOMTR-MCNC: 149 MG/DL — HIGH (ref 70–99)
GLUCOSE BLDC GLUCOMTR-MCNC: 244 MG/DL — HIGH (ref 70–99)

## 2022-12-23 PROCEDURE — 99239 HOSP IP/OBS DSCHRG MGMT >30: CPT

## 2022-12-23 RX ADMIN — MORPHINE SULFATE 2 MILLIGRAM(S): 50 CAPSULE, EXTENDED RELEASE ORAL at 09:19

## 2022-12-23 RX ADMIN — MORPHINE SULFATE 2 MILLIGRAM(S): 50 CAPSULE, EXTENDED RELEASE ORAL at 03:59

## 2022-12-23 RX ADMIN — Medication 1 TABLET(S): at 05:58

## 2022-12-23 RX ADMIN — MORPHINE SULFATE 2 MILLIGRAM(S): 50 CAPSULE, EXTENDED RELEASE ORAL at 11:39

## 2022-12-23 RX ADMIN — MORPHINE SULFATE 2 MILLIGRAM(S): 50 CAPSULE, EXTENDED RELEASE ORAL at 08:25

## 2022-12-23 RX ADMIN — Medication 3 UNIT(S): at 11:39

## 2022-12-23 RX ADMIN — MORPHINE SULFATE 2 MILLIGRAM(S): 50 CAPSULE, EXTENDED RELEASE ORAL at 04:30

## 2022-12-23 RX ADMIN — Medication 3 UNIT(S): at 08:26

## 2022-12-23 RX ADMIN — Medication 120 MILLIGRAM(S): at 05:57

## 2022-12-23 NOTE — PROGRESS NOTE ADULT - SUBJECTIVE AND OBJECTIVE BOX
CAPILLARY BLOOD GLUCOSE      POCT Blood Glucose.: 149 mg/dL (23 Dec 2022 06:09)  POCT Blood Glucose.: 126 mg/dL (22 Dec 2022 20:45)  POCT Blood Glucose.: 142 mg/dL (22 Dec 2022 17:05)  POCT Blood Glucose.: 300 mg/dL (22 Dec 2022 15:04)  POCT Blood Glucose.: 427 mg/dL (22 Dec 2022 14:02)  POCT Blood Glucose.: 526 mg/dL (22 Dec 2022 11:58)  POCT Blood Glucose.: 561 mg/dL (22 Dec 2022 11:55)      Vital Signs Last 24 Hrs  T(C): 36.8 (23 Dec 2022 05:31), Max: 36.8 (22 Dec 2022 12:39)  T(F): 98.2 (23 Dec 2022 05:31), Max: 98.2 (22 Dec 2022 12:39)  HR: 90 (23 Dec 2022 05:31) (74 - 90)  BP: 143/74 (23 Dec 2022 05:31) (104/66 - 143/74)  BP(mean): --  RR: 18 (23 Dec 2022 05:31) (18 - 18)  SpO2: 98% (23 Dec 2022 05:31) (98% - 100%)    Parameters below as of 22 Dec 2022 19:54  Patient On (Oxygen Delivery Method): room air        General: WN/WD NAD  Respiratory: CTA B/L  CV: RRR, S1S2, no murmurs, rubs or gallops  Abdominal: Soft, NT, ND +BS, Last BM  Extremities: No edema, + peripheral pulses     12-22    129<L>  |  96  |  49<H>  ----------------------------<  580<HH>  5.0   |  26  |  1.31<H>    Ca    7.8<L>      22 Dec 2022 12:03        dextrose 50% Injectable 25 Gram(s) IV Push once  dextrose Oral Gel 15 Gram(s) Oral once PRN  glucagon  Injectable 1 milliGRAM(s) IntraMuscular once  insulin glargine Injectable (LANTUS) 25 Unit(s) SubCutaneous at bedtime  insulin lispro (ADMELOG) corrective regimen sliding scale   SubCutaneous three times a day before meals  insulin lispro (ADMELOG) corrective regimen sliding scale   SubCutaneous at bedtime  insulin lispro Injectable (ADMELOG) 3 Unit(s) SubCutaneous three times a day before meals  simvastatin 5 milliGRAM(s) Oral at bedtime

## 2022-12-23 NOTE — PROGRESS NOTE ADULT - SUBJECTIVE AND OBJECTIVE BOX
Neurology follow up note    GE [id number]      Interval History:    Patient feels ok no new complaints.    Allergies    No Known Allergies    Intolerances        MEDICATIONS    acetaminophen     Tablet .. 650 milliGRAM(s) Oral every 6 hours PRN  aluminum hydroxide/magnesium hydroxide/simethicone Suspension 30 milliLiter(s) Oral every 4 hours PRN  amoxicillin  875 milliGRAM(s)/clavulanate 1 Tablet(s) Oral two times a day  bisacodyl 5 milliGRAM(s) Oral at bedtime  dextrose 5%. 1000 milliLiter(s) IV Continuous <Continuous>  dextrose 50% Injectable 25 Gram(s) IV Push once  dextrose Oral Gel 15 Gram(s) Oral once PRN  diltiazem    milliGRAM(s) Oral daily  enoxaparin Injectable 40 milliGRAM(s) SubCutaneous every 24 hours  glucagon  Injectable 1 milliGRAM(s) IntraMuscular once  insulin glargine Injectable (LANTUS) 25 Unit(s) SubCutaneous at bedtime  insulin lispro (ADMELOG) corrective regimen sliding scale   SubCutaneous three times a day before meals  insulin lispro (ADMELOG) corrective regimen sliding scale   SubCutaneous at bedtime  insulin lispro Injectable (ADMELOG) 3 Unit(s) SubCutaneous three times a day before meals  melatonin 3 milliGRAM(s) Oral at bedtime PRN  morphine  - Injectable 2 milliGRAM(s) IV Push every 4 hours PRN  ondansetron Injectable 4 milliGRAM(s) IV Push every 8 hours PRN  oxycodone    5 mG/acetaminophen 325 mG 1 Tablet(s) Oral every 4 hours PRN  polyethylene glycol 3350 17 Gram(s) Oral daily  simvastatin 5 milliGRAM(s) Oral at bedtime  sodium chloride 0.9%. 1000 milliLiter(s) IV Continuous <Continuous>              Vital Signs Last 24 Hrs  T(C): 36.7 (23 Dec 2022 11:43), Max: 36.8 (23 Dec 2022 05:31)  T(F): 98.1 (23 Dec 2022 11:43), Max: 98.2 (23 Dec 2022 05:31)  HR: 80 (23 Dec 2022 11:43) (74 - 90)  BP: 108/64 (23 Dec 2022 11:43) (108/64 - 143/74)  BP(mean): --  RR: 18 (23 Dec 2022 11:43) (18 - 19)  SpO2: 97% (23 Dec 2022 11:43) (97% - 100%)    Parameters below as of 23 Dec 2022 11:43  Patient On (Oxygen Delivery Method): room air          REVIEW OF SYSTEMS:  Constitutionally, the patient denies fever, chills, or night sweats.  Head:  No headaches.  Eyes:  No double vision or blurry vision.  Ears:  No ringing in the ears.  Neck:  No neck pain.  Cardiovascular:  No chest pain.  Respiratory:  No shortness of breath.  Abdomen:  No nausea, vomiting, or abdominal pain.  Extremities/Neurological:  No numbness or tingling.  Musculoskeletal:  Positive left shoulder pain, but does have a fracture.    PHYSICAL EXAMINATION:   HEENT:  Head:  Normocephalic, atraumatic.  Eyes:  No scleral icterus.  Ears:  Hearing bilaterally was intact.  NECK:  Supple.  CARDIOVASCULAR:  S1 and S2 heard.  RESPIRATORY:  Good air entry bilaterally.  ABDOMEN:  Soft, nontender.  EXTREMITIES:  No clubbing or cyanosis were noted.      NEUROLOGIC:  The patient awake and alert.  Extraocular movements were intact.  Speech was fluent.  Smile symmetric.  Right upper 5/5, left upper was not tested proximally secondary to fracture, but hand grasp was 4/5, bilateral lower extremities 4-/5.  Sensory:  Bilateral upper and lower intact to light touch.                  LABS:  CBC Full  -  ( 22 Dec 2022 06:00 )  WBC Count : 9.12 K/uL  RBC Count : 2.84 M/uL  Hemoglobin : 8.0 g/dL  Hematocrit : 25.1 %  Platelet Count - Automated : 410 K/uL  Mean Cell Volume : 88.4 fl  Mean Cell Hemoglobin : 28.2 pg  Mean Cell Hemoglobin Concentration : 31.9 gm/dL  Auto Neutrophil # : x  Auto Lymphocyte # : x  Auto Monocyte # : x  Auto Eosinophil # : x  Auto Basophil # : x  Auto Neutrophil % : x  Auto Lymphocyte % : x  Auto Monocyte % : x  Auto Eosinophil % : x  Auto Basophil % : x      12-22    129<L>  |  96  |  49<H>  ----------------------------<  580<HH>  5.0   |  26  |  1.31<H>    Ca    7.8<L>      22 Dec 2022 12:03      Hemoglobin A1C:       Vitamin B12         RADIOLOGY      ANALYSIS AND PLAN:  This is an 82-year-old with episode of fall and altered mental status.  For episode of fall, most likely mechanical in nature.  No clear sign, even though examinations is limited of left upper extremity, to suggest a new cerebrovascular accident has ensued and no history to suggest underlying epilepsy.  For episode of altered mental status, most likely metabolic secondary to uncontrolled blood sugars, suspect less likely a primary CNS event such as cerebrovascular accident.  For history of diabetes, strict control of blood sugars.  For hyperlipidemia, statin.  For hypertension, monitor systolic blood pressure.  For left shoulder fracture, Orthopedics followup.  neurologic  wise stable   no new events     Greater than 34 minutes of time was spent with the patient, plan of care, reviewing data, with greater than 50% of the visit was spent counseling and/or coordinating care with multidisciplinary healthcare team

## 2022-12-23 NOTE — PROGRESS NOTE ADULT - PROBLEM SELECTOR PLAN 1
cont lantus 25 units qhs  cont admelog 3 units 3x/day before meals  cont admelog corrective scale coverage qac/qhs  cont cons cho diet  goal bg 100-180 in hosp setting
Type 2 A1c 8.6% adm AMS  discussed w. Dr. Perlman and Dr. Lee  STAT BMP sent, glucose 580  will order 10 units admelog stat x1, 1L NS bolus, recheck F/S in 1 hour  d/c glucerna shake, discussed w/ pt to prioritize protein sources  increased ISS to moderate  c/w Lantus 25 units @ HS  c/w admelog 3 units premeal  Recommend endocrine-Perlman onconsult  FU appt: TBA, going to rehab  DSC recommendations: return to home regimen and glucose monitoring  diabetes education provided as documented above  Diabetes support info and cell # [phone] given   Goal 100-180 mg/dL; 140-180 mg/dL in critical care areas

## 2023-01-04 PROCEDURE — 83735 ASSAY OF MAGNESIUM: CPT

## 2023-01-04 PROCEDURE — 93005 ELECTROCARDIOGRAM TRACING: CPT

## 2023-01-04 PROCEDURE — 87077 CULTURE AEROBIC IDENTIFY: CPT

## 2023-01-04 PROCEDURE — 72125 CT NECK SPINE W/O DYE: CPT | Mod: MA

## 2023-01-04 PROCEDURE — 71045 X-RAY EXAM CHEST 1 VIEW: CPT

## 2023-01-04 PROCEDURE — 73030 X-RAY EXAM OF SHOULDER: CPT

## 2023-01-04 PROCEDURE — 82009 KETONE BODYS QUAL: CPT

## 2023-01-04 PROCEDURE — 96360 HYDRATION IV INFUSION INIT: CPT

## 2023-01-04 PROCEDURE — 99285 EMERGENCY DEPT VISIT HI MDM: CPT

## 2023-01-04 PROCEDURE — 97530 THERAPEUTIC ACTIVITIES: CPT

## 2023-01-04 PROCEDURE — 82962 GLUCOSE BLOOD TEST: CPT

## 2023-01-04 PROCEDURE — 83036 HEMOGLOBIN GLYCOSYLATED A1C: CPT

## 2023-01-04 PROCEDURE — 82553 CREATINE MB FRACTION: CPT

## 2023-01-04 PROCEDURE — 97161 PT EVAL LOW COMPLEX 20 MIN: CPT

## 2023-01-04 PROCEDURE — 70450 CT HEAD/BRAIN W/O DYE: CPT | Mod: MA

## 2023-01-04 PROCEDURE — 87186 SC STD MICRODIL/AGAR DIL: CPT

## 2023-01-04 PROCEDURE — 84484 ASSAY OF TROPONIN QUANT: CPT

## 2023-01-04 PROCEDURE — 97116 GAIT TRAINING THERAPY: CPT

## 2023-01-04 PROCEDURE — 73630 X-RAY EXAM OF FOOT: CPT

## 2023-01-04 PROCEDURE — 83690 ASSAY OF LIPASE: CPT

## 2023-01-04 PROCEDURE — 81001 URINALYSIS AUTO W/SCOPE: CPT

## 2023-01-04 PROCEDURE — 80053 COMPREHEN METABOLIC PANEL: CPT

## 2023-01-04 PROCEDURE — 80048 BASIC METABOLIC PNL TOTAL CA: CPT

## 2023-01-04 PROCEDURE — 87637 SARSCOV2&INF A&B&RSV AMP PRB: CPT

## 2023-01-04 PROCEDURE — 85025 COMPLETE CBC W/AUTO DIFF WBC: CPT

## 2023-01-04 PROCEDURE — 82550 ASSAY OF CK (CPK): CPT

## 2023-01-04 PROCEDURE — 73000 X-RAY EXAM OF COLLAR BONE: CPT

## 2023-01-04 PROCEDURE — 36415 COLL VENOUS BLD VENIPUNCTURE: CPT

## 2023-01-04 PROCEDURE — 87635 SARS-COV-2 COVID-19 AMP PRB: CPT

## 2023-01-04 PROCEDURE — 85027 COMPLETE CBC AUTOMATED: CPT

## 2023-01-04 PROCEDURE — 82803 BLOOD GASES ANY COMBINATION: CPT

## 2023-01-04 PROCEDURE — 87086 URINE CULTURE/COLONY COUNT: CPT

## 2023-11-30 RX ORDER — INSULIN GLARGINE 100 [IU]/ML
26 INJECTION, SOLUTION SUBCUTANEOUS
Qty: 0 | Refills: 0 | DISCHARGE

## 2023-11-30 RX ORDER — DOCUSATE SODIUM 100 MG
1 CAPSULE ORAL
Qty: 0 | Refills: 0 | DISCHARGE

## 2023-11-30 RX ORDER — SIMVASTATIN 20 MG/1
1 TABLET, FILM COATED ORAL
Qty: 0 | Refills: 0 | DISCHARGE

## 2023-11-30 RX ORDER — LIRAGLUTIDE 6 MG/ML
0 INJECTION SUBCUTANEOUS
Qty: 0 | Refills: 0 | DISCHARGE

## 2023-11-30 RX ORDER — METFORMIN HYDROCHLORIDE 850 MG/1
1 TABLET ORAL
Qty: 0 | Refills: 0 | DISCHARGE

## 2023-11-30 RX ORDER — PREGABALIN 225 MG/1
1 CAPSULE ORAL
Qty: 0 | Refills: 0 | DISCHARGE

## 2023-11-30 RX ORDER — DILTIAZEM HCL 120 MG
1 CAPSULE, EXT RELEASE 24 HR ORAL
Qty: 0 | Refills: 0 | DISCHARGE

## 2025-02-18 NOTE — PROGRESS NOTE ADULT - PROVIDER SPECIALTY LIST ADULT
At this time,patient meets LTAC criteria.    Electronically signed by Noemi Reynolds RN on 2/18/2025 at 11:52 AM   
Hospitalist
Neurology
Diabetes
Hospitalist
Endocrinology